# Patient Record
Sex: MALE | Race: ASIAN | NOT HISPANIC OR LATINO | Employment: FULL TIME | URBAN - METROPOLITAN AREA
[De-identification: names, ages, dates, MRNs, and addresses within clinical notes are randomized per-mention and may not be internally consistent; named-entity substitution may affect disease eponyms.]

---

## 2017-01-09 ENCOUNTER — OFFICE VISIT (OUTPATIENT)
Dept: UROLOGY | Facility: CLINIC | Age: 47
End: 2017-01-09
Payer: COMMERCIAL

## 2017-01-09 ENCOUNTER — APPOINTMENT (OUTPATIENT)
Dept: UROLOGY | Facility: CLINIC | Age: 47
End: 2017-01-09
Payer: COMMERCIAL

## 2017-01-09 VITALS
WEIGHT: 190 LBS | HEIGHT: 68 IN | DIASTOLIC BLOOD PRESSURE: 92 MMHG | HEART RATE: 80 BPM | SYSTOLIC BLOOD PRESSURE: 130 MMHG | BODY MASS INDEX: 28.79 KG/M2

## 2017-01-09 DIAGNOSIS — C67.2 MALIGNANT NEOPLASM OF LATERAL WALL OF URINARY BLADDER (H): Primary | ICD-10-CM

## 2017-01-09 DIAGNOSIS — Z85.51 PERSONAL HISTORY OF MALIGNANT NEOPLASM OF BLADDER: Primary | ICD-10-CM

## 2017-01-09 LAB
ALBUMIN UR-MCNC: NEGATIVE MG/DL
APPEARANCE UR: CLEAR
BILIRUB UR QL STRIP: NEGATIVE
COLOR UR AUTO: YELLOW
GLUCOSE UR STRIP-MCNC: NEGATIVE MG/DL
HGB UR QL STRIP: NEGATIVE
KETONES UR STRIP-MCNC: NEGATIVE MG/DL
LEUKOCYTE ESTERASE UR QL STRIP: NEGATIVE
NITRATE UR QL: NEGATIVE
PH UR STRIP: 6 PH (ref 5–7)
SP GR UR STRIP: >1.03 (ref 1–1.03)
URN SPEC COLLECT METH UR: NORMAL
UROBILINOGEN UR STRIP-ACNC: 0.2 EU/DL (ref 0.2–1)

## 2017-01-09 PROCEDURE — 99212 OFFICE O/P EST SF 10 MIN: CPT | Mod: 25 | Performed by: UROLOGY

## 2017-01-09 PROCEDURE — 88305 TISSUE EXAM BY PATHOLOGIST: CPT | Performed by: UROLOGY

## 2017-01-09 PROCEDURE — 81003 URINALYSIS AUTO W/O SCOPE: CPT | Performed by: UROLOGY

## 2017-01-09 PROCEDURE — 52224 CYSTOSCOPY AND TREATMENT: CPT | Performed by: UROLOGY

## 2017-01-09 RX ORDER — CIPROFLOXACIN 500 MG/1
500 TABLET, FILM COATED ORAL 2 TIMES DAILY
Qty: 1 TABLET | Refills: 0 | Status: SHIPPED | OUTPATIENT
Start: 2017-01-09 | End: 2017-01-16

## 2017-01-09 ASSESSMENT — PAIN SCALES - GENERAL: PAINLEVEL: NO PAIN (0)

## 2017-01-09 NOTE — NURSING NOTE
Chief Complaint   Patient presents with     Cystoscopy     bladder biopsy     Snow Meneses LPN 9:10 AM January 9, 2017    Prior to the start of the procedure and with procedural staff participation, I verbally confirmed the patient s identity using two indicators, relevant allergies, that the procedure was appropriate and matched the consent or emergent situation, and that the correct equipment/implants were available. Immediately prior to starting the procedure I conducted the Time Out with the procedural staff and re-confirmed the patient s name, procedure, and site/side. (The Joint Commission universal protocol was followed.)  Yes    Sedation (Moderate or Deep): None    Snow Meneses LPN 9:10 AM January 9, 2017

## 2017-01-09 NOTE — Clinical Note
1/9/2017       RE: Adarsh Lozano  [address] [facility] [address]     Dear Colleague,    Thank you for referring your patient, Adarsh Lozano, to the [facility] UROLOGY CLINIC [place] at [facility]. Please see a copy of my visit note below.    This very pleasant  46-year-old gentleman returns today for cystoscopy with bladder biopsy and fulguration after a recent cystoscopy had shown a small recurrent tumor on the left side of the bladder in a patient with a previous history of low-grade superficial bladder cancer.    Procedure.  Cystoscopywith bladder biopsy and fulguration of tumor.  Surgeon.  Daisy.  Anesthesia.  Local anesthesia  Procedure.  With the patient in the supine position and with the genital area prepped and draped in the customary fashion, with local anesthetic jelly in the urethra and local anesthetic solution in the bladder for 15 minutes, the flexible cystoscope was carefully passed into the urethra.  The penile urethra is normal.  The external sphincter is intact.  Prostatic urethra is nonobstructive. .  The interior the bladder was carefully inspected, a small tumor, 5 mm in diameter, was seen just below the site of a previous bladder tumor resection..  No other tumors were seen in the bladder.  This tumor was then biopsied with the very small biopsy instrument, the biopsy placed in formalin to be sent for histologic examination.  I then removed the biopsy instrument and passed a small Bugbee electrode and the entire area of the tumor was carefully fulgurated to my satisfaction.i then withdrew the instrument.  There were no other significant features and no complications.    Impression.  I discussed the procedure with the patient in detail, and then had a careful discussion with the patient about the need for careful follow-up, we will review the pathology report, we did discuss the possibility of intravesical treatment  depending on the pathology report and the need for careful long-term surveillance.    Plan.  I will see him in one week for review of the pathology report.    Time.  10 minutes was spent in addition to the procedure and noted discussed the findings, the potential additional alternative therapeutic options, and the need for long-term surveillance    Again, thank you for allowing me to participate in the care of your patient.      Sincerely,    Aiden Villa MD

## 2017-01-09 NOTE — PROGRESS NOTES
This very pleasant  46-year-old gentleman returns today for cystoscopy with bladder biopsy and fulguration after a recent cystoscopy had shown a small recurrent tumor on the left side of the bladder in a patient with a previous history of low-grade superficial bladder cancer.    Procedure.  Cystoscopywith bladder biopsy and fulguration of tumor.  Surgeon.  Daisy.  Anesthesia.  Local anesthesia  Procedure.  With the patient in the supine position and with the genital area prepped and draped in the customary fashion, with local anesthetic jelly in the urethra and local anesthetic solution in the bladder for 15 minutes, the flexible cystoscope was carefully passed into the urethra.  The penile urethra is normal.  The external sphincter is intact.  Prostatic urethra is nonobstructive. .  The interior the bladder was carefully inspected, a small tumor, 5 mm in diameter, was seen just below the site of a previous bladder tumor resection..  No other tumors were seen in the bladder.  This tumor was then biopsied with the very small biopsy instrument, the biopsy placed in formalin to be sent for histologic examination.  I then removed the biopsy instrument and passed a small Bugbee electrode and the entire area of the tumor was carefully fulgurated to my satisfaction.i then withdrew the instrument.  There were no other significant features and no complications.    Impression.  I discussed the procedure with the patient in detail, and then had a careful discussion with the patient about the need for careful follow-up, we will review the pathology report, we did discuss the possibility of intravesical treatment depending on the pathology report and the need for careful long-term surveillance.    Plan.  I will see him in one week for review of the pathology report.    Time.  10 minutes was spent in addition to the procedure and noted discussed the findings, the potential additional alternative therapeutic options, and the  need for long-term surveillance

## 2017-01-09 NOTE — PATIENT INSTRUCTIONS
"     AFTER YOUR CYSTOSCOPY         You have just completed a cystoscopy, or \"cysto\", which allowed your physician to learn more about your bladder (or to remove a stent placed after surgery). We suggest that you continue to avoid caffeine, fruit juice, and alcohol for the next 24 hours, however, you are encouraged to return to your normal activities.       A few things that are considered normal after your cystoscopy:    * small amount of bleeding (or spotting) that clears within the next 24 hours    * slight burning sensation with urination    * sensation to of needing to avoid more frequently    * the feeling of \"air\" in your urine    * mild discomfort that is relieved with Tylonol        Please contact our office promptly if you:    * develop a fever above 101 degrees    * are unable to urinate    * develop bright red blood that does not stop    * severe pain or swelling        And of course, please contact our office with any concerns or questions [phone]      "

## 2017-01-11 LAB — COPATH REPORT: NORMAL

## 2017-01-12 NOTE — PROGRESS NOTES
This very pleasant 46-year-old gentleman returns today cystoscopy, biopsy and fulguration of recurrent bladder tumor    Procedure.  Cystoscopy, bladder biopsy and fulguration of bladder tumor    Surgeon.Daisy  Anesthesia.  Local anesthesia  Description.  With the patient in the supine position.  With the genital area prepped and draped in lithotomy fashion and local anesthetic in the urethra.  The flexible cystoscope was then passed into the penile urethra.  The penile urethra and meatus are normal.  The external sphincter is intact.  Prostatic urethra was nonobstructed and non-hyperplastic.  The interior the bladder was carefully inspected.  There is no evidence of stone formation.  There is no evidence of trabeculation.  A small recurrent tumor is seen on the left side of the bladder adjacent to the site of a previous bladder tumor treatment.  It is approximate 6 mm in diameter.  With the small biopsy instrument I was able to take a good biopsy from the tumor.  This was sent off in formalin.  I then reinserted the cystoscope and using the small Bugbee electrode was able to carefully fulgurate and destroy the entire area with the coagulating current.  The patient tolerated the procedure well.  No Irby catheter was placed.    Plan.  The patient will return to the office in a week's time for discussion of the pathology report and to discuss potential future surveillance of management options.    Time.  10 minutes was spent in addition to the procedure and other discussed the findings to discuss the expectations following the procedure, the talked about plans for follow-up and surveillance and potential other treatment options

## 2017-01-16 ENCOUNTER — OFFICE VISIT (OUTPATIENT)
Dept: UROLOGY | Facility: CLINIC | Age: 47
End: 2017-01-16
Payer: COMMERCIAL

## 2017-01-16 VITALS
HEIGHT: 68 IN | SYSTOLIC BLOOD PRESSURE: 128 MMHG | DIASTOLIC BLOOD PRESSURE: 68 MMHG | BODY MASS INDEX: 28.79 KG/M2 | WEIGHT: 190 LBS

## 2017-01-16 DIAGNOSIS — C67.2 MALIGNANT NEOPLASM OF LATERAL WALL OF URINARY BLADDER (H): Primary | ICD-10-CM

## 2017-01-16 PROCEDURE — 99214 OFFICE O/P EST MOD 30 MIN: CPT | Performed by: UROLOGY

## 2017-01-16 ASSESSMENT — PAIN SCALES - GENERAL: PAINLEVEL: NO PAIN (0)

## 2017-01-16 NOTE — NURSING NOTE
Chief Complaint   Patient presents with     Results     Go over pathology results      Alison Zhang LPN

## 2017-01-16 NOTE — MR AVS SNAPSHOT
"              After Visit Summary   1/16/2017    Adarsh Lozano    MRN: [id number]           Patient Information     Date Of Birth          1970        Visit Information        Provider Department      1/16/2017 9:40 AM Aiden Villa MD [facility]         Follow-ups after your visit        Your next 10 appointments already scheduled     Jan 16, 2017  9:40 AM   Return Visit with Aiden Villa MD   [facility])    [address]  [address]  [facility] [address] [phone]              Future tests that were ordered for you today     Open Future Orders        Priority Expected Expires Ordered    CYSTOURETHROSCOPY W BIOPSY (48898) Routine  2/23/2017 1/9/2017            Who to contact     If you have questions or need follow up information about today's clinic visit or your schedule please contact [facility]OLOGY [facility] directly at [phone].  Normal or non-critical lab and imaging results will be communicated to you by En[employer] Systemshart, letter or phone within 4 business days after the clinic has received the results. If you do not hear from us within 7 days, please contact the clinic through [employer] or phone. If you have a critical or abnormal lab result, we will notify you by phone as soon as possible.  Submit refill requests through [employer] or call your pharmacy and they will forward the refill request to us. Please allow 3 business days for your refill to be completed.          Additional Information About Your Visit        Enmetric Systemshart Information     Choosly lets you send messages to your doctor, view your test results, renew your prescriptions, schedule appointments and more. To sign up, go to [email or web address] . Click on \"Log in\" on the left side of the screen, which will take you to the Welcome page. Then click on \"Sign up Now\" on the right side of the " page.     You will be asked to enter the access code listed below, as well as some personal information. Please follow the directions to create your username and password.     Your access code is: [email or web address]  Expires: 3/19/2017  2:57 PM     Your access code will  in 90 days. If you need help or a new code, please call your [place] clinic or [phone].        Care EveryWhere ID     This is your Care EveryWhere ID. This could be used by other organizations to access your [place] medical records  [id number]         Blood Pressure from Last 3 Encounters:   17 130/92   16 130/88   16 124/76    Weight from Last 3 Encounters:   17 86.183 kg (190 lb)   16 88.451 kg (195 lb)   16 83.008 kg (183 lb)              Today, you had the following     No orders found for display       Primary Care Provider Office Phone # Fax #    Dayron Michael Grewal [phone]       [address] [address]        Thank you!     Thank you for choosing [facility] UROLOGY [facility]  for your care. Our goal is always to provide you with excellent care. Hearing back from our patients is one way we can continue to improve our services. Please take a few minutes to complete the written survey that you may receive in the mail after your visit with us. Thank you!             Your Updated Medication List - Protect others around you: Learn how to safely use, store and throw away your medicines at www.disposemymeds.org.          This list is accurate as of: 17 10:06 AM.  Always use your most recent med list.                   Brand Name Dispense Instructions for use    albuterol 108 (90 BASE) MCG/ACT Inhaler    PROAIR HFA/PROVENTIL HFA/VENTOLIN HFA    2 Inhaler    Inhale 2 puffs into the lungs every 6 hours       ciprofloxacin 500 MG tablet    CIPRO    1 tablet    Take 1 tablet (500 mg) by mouth 2 times daily        lisinopril 10 MG tablet    PRINIVIL/ZESTRIL    90 tablet    Take 1 tablet (10 mg) by mouth daily

## 2017-01-16 NOTE — PROGRESS NOTES
This very pleasant46-year-old gentleman returns today for discussion of a pathology report.  We recall, he had first presented in 2014 after an episode of hematuria.  He was found to have a low-grade superficial bladder cancer that was resected  He did have a small recurrence about a year later.,  And then almost 2 years after that had a further small recurrence.  This was recently treated in the office .  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  This is a reassuringreport.  I therefore had a lengthy discussion with patient and his wife today to discuss these findings and also to talk about whether we should consider intravesical treatment.  I went over the potential treatments including the use of mitomycin or BCGand discussed the potential side effects that could occur and the benefits that could accrue.  His disease has been low-grade and therefore it is not essential at this point to consider such treatment, but it may reduce the potential for recurrence.  He and his wife will discuss this.  Whatever happens we will need to repeat cystoscopy in the office in 3 months.  I went over the entire situation with the patient and his wife in detail today.  I answered all the questions.    Plan.  Cystoscopy in 3 months along with urine cytology.  They will be considering whether to proceed with BCG treatments ×6.    Time.  25 minutes.  Greater than 50% in consultation and lengthy discussion about potential for intravesical treatment

## 2017-01-16 NOTE — Clinical Note
1/16/2017       RE: Adarsh Lozano  [address] [facility] [address]     Dear Colleague,    Thank you for referring your patient, Adarsh Lozano, to the [facility] UROLOGY CLINIC [place] at [facility]. Please see a copy of my visit note below.    This very pleasant46-year-old gentleman returns today for discussion of a pathology report.  We recall, he had first presented in 2014 after an episode of hematuria.  He was found to have a low-grade superficial bladder cancer that was resected  He did have a small recurrence about a year later.,  And then almost 2 years after that had a further small recurrence.  This was recently treated in the office .  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  This is a reassuringreport.  I therefore had a lengthy discussion with patient and his wife today to discuss these findings and also to talk about whether we should consider intravesical treatment.  I went over the potential treatments including the use of mitomycin or BCGand discussed the potential side effects that could occur and the benefits that could accrue.  His disease has been low-grade and therefore it is not essential at this point to consider such treatment, but it may reduce the potential for recurrence.  He and his wife will discuss this.  Whatever happens we will need to repeat cystoscopy in the office in 3 months.  I went over the entire situation with the patient and his wife in detail today.  I answered all the questions.    Plan.  Cystoscopy in 3 months along with urine cytology.  They will be considering whether to proceed with BCG treatments ×6.    Time.  25 minutes.  Greater than 50% in consultation and lengthy discussion about potential for intravesical treatment    Again, thank you for allowing me to participate in the care of your patient.       Sincerely,    Aiden Villa MD

## 2017-04-10 ENCOUNTER — OFFICE VISIT (OUTPATIENT)
Dept: UROLOGY | Facility: CLINIC | Age: 47
End: 2017-04-10
Payer: COMMERCIAL

## 2017-04-10 VITALS
BODY MASS INDEX: 28.79 KG/M2 | SYSTOLIC BLOOD PRESSURE: 124 MMHG | WEIGHT: 190 LBS | HEIGHT: 68 IN | HEART RATE: 76 BPM | DIASTOLIC BLOOD PRESSURE: 70 MMHG

## 2017-04-10 DIAGNOSIS — C67.2 MALIGNANT NEOPLASM OF LATERAL WALL OF URINARY BLADDER (H): Primary | ICD-10-CM

## 2017-04-10 DIAGNOSIS — Z85.51 PERSONAL HISTORY OF MALIGNANT NEOPLASM OF BLADDER: ICD-10-CM

## 2017-04-10 DIAGNOSIS — Z85.51 PERSONAL HISTORY OF MALIGNANT NEOPLASM OF BLADDER: Primary | ICD-10-CM

## 2017-04-10 LAB
ALBUMIN UR-MCNC: ABNORMAL MG/DL
APPEARANCE UR: CLEAR
BILIRUB UR QL STRIP: NEGATIVE
COLOR UR AUTO: YELLOW
GLUCOSE UR STRIP-MCNC: NEGATIVE MG/DL
HGB UR QL STRIP: NEGATIVE
KETONES UR STRIP-MCNC: NEGATIVE MG/DL
LEUKOCYTE ESTERASE UR QL STRIP: NEGATIVE
NITRATE UR QL: NEGATIVE
PH UR STRIP: 6.5 PH (ref 5–7)
SP GR UR STRIP: 1.02 (ref 1–1.03)
URN SPEC COLLECT METH UR: ABNORMAL
UROBILINOGEN UR STRIP-ACNC: 0.2 EU/DL (ref 0.2–1)

## 2017-04-10 PROCEDURE — 81003 URINALYSIS AUTO W/O SCOPE: CPT | Performed by: UROLOGY

## 2017-04-10 PROCEDURE — 99212 OFFICE O/P EST SF 10 MIN: CPT | Mod: 25 | Performed by: UROLOGY

## 2017-04-10 PROCEDURE — 52000 CYSTOURETHROSCOPY: CPT | Performed by: UROLOGY

## 2017-04-10 RX ORDER — CIPROFLOXACIN 500 MG/1
500 TABLET, FILM COATED ORAL 2 TIMES DAILY
Qty: 1 TABLET | Refills: 0 | Status: SHIPPED | OUTPATIENT
Start: 2017-04-10 | End: 2019-02-20

## 2017-04-10 ASSESSMENT — PAIN SCALES - GENERAL: PAINLEVEL: NO PAIN (0)

## 2017-04-10 NOTE — PATIENT INSTRUCTIONS
"     AFTER YOUR CYSTOSCOPY         You have just completed a cystoscopy, or \"cysto\", which allowed your physician to learn more about your bladder (or to remove a stent placed after surgery). We suggest that you continue to avoid caffeine, fruit juice, and alcohol for the next 24 hours, however, you are encouraged to return to your normal activities.       A few things that are considered normal after your cystoscopy:    * small amount of bleeding (or spotting) that clears within the next 24 hours    * slight burning sensation with urination    * sensation to of needing to avoid more frequently    * the feeling of \"air\" in your urine    * mild discomfort that is relieved with Tylonol        Please contact our office promptly if you:    * develop a fever above 101 degrees    * are unable to urinate    * develop bright red blood that does not stop    * severe pain or swelling        And of course, please contact our office with any concerns or questions [phone]      "

## 2017-04-10 NOTE — NURSING NOTE
Chief Complaint   Patient presents with     History of Bladder Cancer     Prior to the start of the procedure and with procedural staff participation, I verbally confirmed the patient s identity using two indicators, relevant allergies, that the procedure was appropriate and matched the consent or emergent situation, and that the correct equipment/implants were available. Immediately prior to starting the procedure I conducted the Time Out with the procedural staff and re-confirmed the patient s name, procedure, and site/side. (The Joint Commission universal protocol was followed.)  Yes    Sedation (Moderate or Deep): None  Alison Zhang LPN

## 2017-04-10 NOTE — LETTER
4/10/2017       RE: Adarsh Lozano  [address]  [facility] [place] [address]     Dear Colleague,    Thank you for referring your patient, Adarsh Lozano, to the [facility] UROLOGY CLINIC [place] at [facility]. Please see a copy of my visit note below.    This very pleasant 46-year-old gentleman returns today for check cystoscopy.  We recall that he has a history, of gross hematuria in 2014 and found to have the official tumor in the bladder which was resected and found to be consistent with a low-grade TCC of the bladder.  He did have one small recurrence about 6 months later in late 2014 and then another recurrence 2 years later just 3 months ago and when this was resected it was found to be a low-grade bladder tumor considered to have low malignant potential.  He returns for check cystoscopy today.    Procedure.  Cystoscopy.  Surgeon.   Daisy    Anesthesia.  Local anesthesia.  Discretion.  With the patient in the supine position and with gentle area prepped and draped in the customary fashion with Tara cystoscope was carefully passed into the urethra.  The interior of the urethra was normal.  The external sphincter was intact.  Prostate urethra showed minimal hyperplasia.  Interior of the bladder was carefully inspected.  There was one area of very mild inflammation but no obvious tumor could be seen.  When remarkable features.    Impression the urothelium appears stable at the present time but he has had a propensity for recurrence even though all the tumors had been very low-grade.  We will continue to monitor this very closely.  I did have a very careful discussion with him today about the nature of this type of tumor and we also talked about the potential need to use BCG or some other agent if I was concerned about future recurrence.  We did go over the use of BCG and talked about the benefits and side effects and potential complications associated  "with this.    Plan.  Cystoscopy and urine cytology with fish test in 3 months.    Time.  Tenderness is present in addition to the procedure to have an extensive discussion about alternative treatment options can need for close follow-up as outlined above.    \"This dictation was performed with voice recognition software and may contain errors,  omissions and inadvertent word substitution.\"      Again, thank you for allowing me to participate in the care of your patient.      Sincerely,    Aiden Villa MD      "

## 2017-04-10 NOTE — MR AVS SNAPSHOT
"              After Visit Summary   4/10/2017    Adarsh Lozano    MRN: [id number]           Patient Information     Date Of Birth          1970        Visit Information        Provider Department      4/10/2017 2:10 PM Aiden Villa MD; [facility]        Today's Diagnoses     Malignant neoplasm of lateral wall of urinary bladder (H)    -  1    Personal history of malignant neoplasm of bladder          Care Instructions         AFTER YOUR CYSTOSCOPY         You have just completed a cystoscopy, or \"cysto\", which allowed your physician to learn more about your bladder (or to remove a stent placed after surgery). We suggest that you continue to avoid caffeine, fruit juice, and alcohol for the next 24 hours, however, you are encouraged to return to your normal activities.       A few things that are considered normal after your cystoscopy:    * small amount of bleeding (or spotting) that clears within the next 24 hours    * slight burning sensation with urination    * sensation to of needing to avoid more frequently    * the feeling of \"air\" in your urine    * mild discomfort that is relieved with Tylonol        Please contact our office promptly if you:    * develop a fever above 101 degrees    * are unable to urinate    * develop bright red blood that does not stop    * severe pain or swelling        And of course, please contact our office with any concerns or questions [phone]            Follow-ups after your visit        Your next 10 appointments already scheduled     Jul 10, 2017  2:00 PM CDT   Cystoscopy with Aiden Villa MD, [facility] ([facility])    [address]  [address]  [facility] [id number] [phone]              Who to contact     If you have questions or need follow up information about today's clinic visit or your schedule please contact [facility]" "[facility] [place] directly at [phone].  Normal or non-critical lab and imaging results will be communicated to you by MyChart, letter or phone within 4 business days after the clinic has received the results. If you do not hear from us within 7 days, please contact the clinic through ReaLyn[employer] or phone. If you have a critical or abnormal lab result, we will notify you by phone as soon as possible.  Submit refill requests through Scan or call your pharmacy and they will forward the refill request to us. Please allow 3 business days for your refill to be completed.          Additional Information About Your Visit        [employer] Information     Scan lets you send messages to your doctor, view your test results, renew your prescriptions, schedule appointments and more. To sign up, go to [email or web address] . Click on \"Log in\" on the left side of the screen, which will take you to the Welcome page. Then click on \"Sign up Now\" on the right side of the page.     You will be asked to enter the access code listed below, as well as some personal information. Please follow the directions to create your username and password.     Your access code is: [email or web address]  Expires: 2017  2:40 PM     Your access code will  in 90 days. If you need help or a new code, please call your [place] clinic or [phone].        Care EveryWhere ID     This is your Care EveryWhere ID. This could be used by other organizations to access your [place] medical records  [id number]        Your Vitals Were     Pulse Height BMI (Body Mass Index)             76 1.727 m (5' 8\") 28.89 kg/m2          Blood Pressure from Last 3 Encounters:   04/10/17 124/70   17 128/68   17 (!) 130/92    Weight from Last 3 Encounters:   04/10/17 86.2 kg (190 lb)   17 86.2 kg (190 lb)   17 86.2 kg (190 lb)              We Performed the Following     UA without Microscopic        Primary Care Provider Office " Phone # Fax #    Dayron Grewal [phone]       [facility] [id number] [place] [address]        Thank you!     Thank you for choosing [facility] [place]  for your care. Our goal is always to provide you with excellent care. Hearing back from our patients is one way we can continue to improve our services. Please take a few minutes to complete the written survey that you may receive in the mail after your visit with us. Thank you!             Your Updated Medication List - Protect others around you: Learn how to safely use, store and throw away your medicines at www.disposemymeds.org.          This list is accurate as of: 4/10/17  2:40 PM.  Always use your most recent med list.                   Brand Name Dispense Instructions for use    albuterol 108 (90 BASE) MCG/ACT Inhaler    PROAIR HFA/PROVENTIL HFA/VENTOLIN HFA    2 Inhaler    Inhale 2 puffs into the lungs every 6 hours       lisinopril 10 MG tablet    PRINIVIL/ZESTRIL    90 tablet    Take 1 tablet (10 mg) by mouth daily

## 2017-04-10 NOTE — PROGRESS NOTES
"This very pleasant 46-year-old gentleman returns today for check cystoscopy.  We recall that he has a history, of gross hematuria in 2014 and found to have the official tumor in the bladder which was resected and found to be consistent with a low-grade TCC of the bladder.  He did have one small recurrence about 6 months later in late 2014 and then another recurrence 2 years later just 3 months ago and when this was resected it was found to be a low-grade bladder tumor considered to have low malignant potential.  He returns for check cystoscopy today.    Procedure.  Cystoscopy.  Surgeon.   Daisy    Anesthesia.  Local anesthesia.  Discretion.  With the patient in the supine position and with gentle area prepped and draped in the customary fashion with Tara cystoscope was carefully passed into the urethra.  The interior of the urethra was normal.  The external sphincter was intact.  Prostate urethra showed minimal hyperplasia.  Interior of the bladder was carefully inspected.  There was one area of very mild inflammation but no obvious tumor could be seen.  When remarkable features.    Impression the urothelium appears stable at the present time but he has had a propensity for recurrence even though all the tumors had been very low-grade.  We will continue to monitor this very closely.  I did have a very careful discussion with him today about the nature of this type of tumor and we also talked about the potential need to use BCG or some other agent if I was concerned about future recurrence.  We did go over the use of BCG and talked about the benefits and side effects and potential complications associated with this.    Plan.  Cystoscopy and urine cytology with fish test in 3 months.    Time.  Tenderness is present in addition to the procedure to have an extensive discussion about alternative treatment options can need for close follow-up as outlined above.    \"This dictation was performed with voice recognition " "software and may contain errors,  omissions and inadvertent word substitution.\"    "

## 2017-07-07 DIAGNOSIS — C67.9 BLADDER CANCER (H): Primary | ICD-10-CM

## 2017-07-10 ENCOUNTER — OFFICE VISIT (OUTPATIENT)
Dept: UROLOGY | Facility: CLINIC | Age: 47
End: 2017-07-10
Payer: COMMERCIAL

## 2017-07-10 VITALS
SYSTOLIC BLOOD PRESSURE: 130 MMHG | DIASTOLIC BLOOD PRESSURE: 100 MMHG | HEIGHT: 68 IN | HEART RATE: 100 BPM | BODY MASS INDEX: 28.79 KG/M2 | WEIGHT: 190 LBS

## 2017-07-10 DIAGNOSIS — C67.2 MALIGNANT NEOPLASM OF LATERAL WALL OF URINARY BLADDER (H): ICD-10-CM

## 2017-07-10 LAB
ALBUMIN UR-MCNC: NEGATIVE MG/DL
APPEARANCE UR: CLEAR
BILIRUB UR QL STRIP: NEGATIVE
COLOR UR AUTO: YELLOW
GLUCOSE UR STRIP-MCNC: NEGATIVE MG/DL
HGB UR QL STRIP: NEGATIVE
KETONES UR STRIP-MCNC: NEGATIVE MG/DL
LEUKOCYTE ESTERASE UR QL STRIP: NEGATIVE
NITRATE UR QL: NEGATIVE
PH UR STRIP: 6 PH (ref 5–7)
SP GR UR STRIP: 1.02 (ref 1–1.03)
URN SPEC COLLECT METH UR: NORMAL
UROBILINOGEN UR STRIP-ACNC: 0.2 EU/DL (ref 0.2–1)

## 2017-07-10 PROCEDURE — 99212 OFFICE O/P EST SF 10 MIN: CPT | Mod: 25 | Performed by: UROLOGY

## 2017-07-10 PROCEDURE — 81003 URINALYSIS AUTO W/O SCOPE: CPT | Performed by: UROLOGY

## 2017-07-10 PROCEDURE — 52000 CYSTOURETHROSCOPY: CPT | Performed by: UROLOGY

## 2017-07-10 PROCEDURE — 88112 CYTOPATH CELL ENHANCE TECH: CPT | Performed by: UROLOGY

## 2017-07-10 RX ORDER — CIPROFLOXACIN 500 MG/1
500 TABLET, FILM COATED ORAL 2 TIMES DAILY
Qty: 1 TABLET | Refills: 0 | Status: SHIPPED | OUTPATIENT
Start: 2017-07-10 | End: 2017-12-11

## 2017-07-10 ASSESSMENT — PAIN SCALES - GENERAL: PAINLEVEL: NO PAIN (0)

## 2017-07-10 NOTE — NURSING NOTE
Chief Complaint   Patient presents with     Cystoscopy     Bladder cancer recheck     Snow Meneses LPN 2:02 PM July 10, 2017    Prior to the start of the procedure and with procedural staff participation, I verbally confirmed the patient s identity using two indicators, relevant allergies, that the procedure was appropriate and matched the consent or emergent situation, and that the correct equipment/implants were available. Immediately prior to starting the procedure I conducted the Time Out with the procedural staff and re-confirmed the patient s name, procedure, and site/side. I have wiped the patient off with the povidone-Iodine solution, draped them,  used Lidocaine hydrochloride jelly, and instilled sterile water into the bladder. (The Joint Commission universal protocol was followed.)  Yes    Sedation (Moderate or Deep): None    Snow Meneses LPN 2:02 PM July 10, 2017

## 2017-07-10 NOTE — LETTER
7/10/2017       RE: Adarsh Lozano  [address]  [facility] [address]     Dear Colleague,    Thank you for referring your patient, Adarsh Lozano, to the [facility] [place] at [facility]. Please see a copy of my visit note below.    This very pleasant 46-year-old gentleman returns today for check cystoscopy.  We recall, that he had presented in 2014 with gross hematuria.  A bladder tumor was identified and resected and was found to be low-grade TCC of the bladder with out evidence of invasion.  He had a further small recurrence 6 months later which is also low-grade, and then another recurrence 2 years after that also small and low-grade and noninvasive.  His check cystoscopy 6 months ago was negative.  He's had no other symptoms, no gross hematuria and no other remarkable medical issues since then.    Procedure.  Cystoscopy.  Surgeon. Daisy.  Anesthesia.  Local anesthesia.  Description.  With the patient in the supine position, and with the genital area prepped and draped in the custom fashion, and the 2% lidocaine and the urethra, the flexible cystoscope was carefully inserted.  The penile urethra was normal.  The external sphincter is intact.  Prosthetic urethra 2.5 cm without evidence of obstruction.  There is no significant median lobe.  Interior bladder was then carefully inspected.  There is no evidence of recurrent tumor in the bladder.  There is no evidence of stone formation.  There are no other remarkable features.    Impression.  The urothelium is stable and there are no other remarkable features at this time.  I recommended therefore at this time and repeat cystoscopy in 6 months and if the bladder is clear at that time we will then go to procedures once a year.  We did have a careful discussion today however about the need for regular follow-up and regular surveillance because of the propensity of this type of tumor to  "recurring the bladder although past history showing a low-grade noninvasive tumor does predict a good prognosis.  I did explain entire situation carefully to the patient in detail today.  I answered all his questions.    Plan.  Cystoscopy and cytology in 6 months.    Time.  10 minutes was spent in addition to the procedure in order to have a careful discussion about the follow-up, biological nature of this type of bladder tumor and the likelihood and potential for recurrence.    \"This dictation was performed with voice recognition software and may contain errors,  omissions and inadvertent word substitution.\"    Again, thank you for allowing me to participate in the care of your patient.      Sincerely,    Aiden Villa MD      "

## 2017-07-10 NOTE — PATIENT INSTRUCTIONS
"     AFTER YOUR CYSTOSCOPY         You have just completed a cystoscopy, or \"cysto\", which allowed your physician to learn more about your bladder (or to remove a stent placed after surgery). We suggest that you continue to avoid caffeine, fruit juice, and alcohol for the next 24 hours, however, you are encouraged to return to your normal activities.       A few things that are considered normal after your cystoscopy:    * small amount of bleeding (or spotting) that clears within the next 24 hours    * slight burning sensation with urination    * sensation to of needing to avoid more frequently    * the feeling of \"air\" in your urine    * mild discomfort that is relieved with Tylonol        Please contact our office promptly if you:    * develop a fever above 101 degrees    * are unable to urinate    * develop bright red blood that does not stop    * severe pain or swelling        And of course, please contact our office with any concerns or questions [phone]      "

## 2017-07-10 NOTE — PROGRESS NOTES
This very pleasant 46-year-old gentleman returns today for check cystoscopy.  We recall, that he had presented in 2014 with gross hematuria.  A bladder tumor was identified and resected and was found to be low-grade TCC of the bladder with out evidence of invasion.  He had a further small recurrence 6 months later which is also low-grade, and then another recurrence 2 years after that also small and low-grade and noninvasive.  His check cystoscopy 6 months ago was negative.  He's had no other symptoms, no gross hematuria and no other remarkable medical issues since then.    Procedure.  Cystoscopy.  Surgeon. Daisy.  Anesthesia.  Local anesthesia.  Description.  With the patient in the supine position, and with the genital area prepped and draped in the custom fashion, and the 2% lidocaine and the urethra, the flexible cystoscope was carefully inserted.  The penile urethra was normal.  The external sphincter is intact.  Prosthetic urethra 2.5 cm without evidence of obstruction.  There is no significant median lobe.  Interior bladder was then carefully inspected.  There is no evidence of recurrent tumor in the bladder.  There is no evidence of stone formation.  There are no other remarkable features.    Impression.  The urothelium is stable and there are no other remarkable features at this time.  I recommended therefore at this time and repeat cystoscopy in 6 months and if the bladder is clear at that time we will then go to procedures once a year.  We did have a careful discussion today however about the need for regular follow-up and regular surveillance because of the propensity of this type of tumor to recurring the bladder although past history showing a low-grade noninvasive tumor does predict a good prognosis.  I did explain entire situation carefully to the patient in detail today.  I answered all his questions.    Plan.  Cystoscopy and cytology in 6 months.    Time.  10 minutes was spent in addition to the  "procedure in order to have a careful discussion about the follow-up, biological nature of this type of bladder tumor and the likelihood and potential for recurrence.    \"This dictation was performed with voice recognition software and may contain errors,  omissions and inadvertent word substitution.\"  .  "

## 2017-07-10 NOTE — MR AVS SNAPSHOT
"              After Visit Summary   7/10/2017    Adarsh Lozano    MRN: [id number]           Patient Information     Date Of Birth          1970        Visit Information        Provider Department      7/10/2017 2:00 PM Aiden Villa MD; [facility]        Today's Diagnoses     Bladder cancer (H)          Care Instructions         AFTER YOUR CYSTOSCOPY         You have just completed a cystoscopy, or \"cysto\", which allowed your physician to learn more about your bladder (or to remove a stent placed after surgery). We suggest that you continue to avoid caffeine, fruit juice, and alcohol for the next 24 hours, however, you are encouraged to return to your normal activities.       A few things that are considered normal after your cystoscopy:    * small amount of bleeding (or spotting) that clears within the next 24 hours    * slight burning sensation with urination    * sensation to of needing to avoid more frequently    * the feeling of \"air\" in your urine    * mild discomfort that is relieved with Tylonol        Please contact our office promptly if you:    * develop a fever above 101 degrees    * are unable to urinate    * develop bright red blood that does not stop    * severe pain or swelling        And of course, please contact our office with any concerns or questions [phone]              Follow-ups after your visit        Follow-up notes from your care team     Return in about 6 months (around 1/10/2018) for cystoscopy, cytology.      Your next 10 appointments already scheduled     Jan 08, 2018  9:00 AM CST   Cystoscopy with Aiden Villa MD, [facility] ([facility])    [address]  [address]  [facility] [id number] [phone]              Who to contact     If you have questions or need follow up information about today's clinic visit or your schedule please contact " "[facility] directly at [phone].  Normal or non-critical lab and imaging results will be communicated to you by MyChart, letter or phone within 4 business days after the clinic has received the results. If you do not hear from us within 7 days, please contact the clinic through [employer] or phone. If you have a critical or abnormal lab result, we will notify you by phone as soon as possible.  Submit refill requests through Hand[employer] or call your pharmacy and they will forward the refill request to us. Please allow 3 business days for your refill to be completed.          Additional Information About Your Visit        [employer] Information     Hand[employer]oints lets you send messages to your doctor, view your test results, renew your prescriptions, schedule appointments and more. To sign up, go to [email or web address] . Click on \"Log in\" on the left side of the screen, which will take you to the Welcome page. Then click on \"Sign up Now\" on the right side of the page.     You will be asked to enter the access code listed below, as well as some personal information. Please follow the directions to create your username and password.     Your access code is: [email or web address]  Expires: 10/8/2017  2:27 PM     Your access code will  in 90 days. If you need help or a new code, please call your [place] clinic or [phone].        Care EveryWhere ID     This is your Care EveryWhere ID. This could be used by other organizations to access your [place] medical records  [id number]        Your Vitals Were     Pulse Height BMI (Body Mass Index)             100 1.727 m (5' 8\") 28.89 kg/m2          Blood Pressure from Last 3 Encounters:   07/10/17 (!) 130/100   04/10/17 124/70   17 128/68    Weight from Last 3 Encounters:   07/10/17 86.2 kg (190 lb)   04/10/17 86.2 kg (190 lb)   17 86.2 kg (190 lb)              We Performed the Following     Cytology non gyn     UA without " [facility]        Primary Care Provider Office Phone # Fax #    Dayron Grewal [phone]       [address] [address]        Equal Access to Services     MARQUITA MAN : Simon solano evelioo Sonikitaali, waaxda luqadaha, qaybta kaalmada adeegyada, karen sawyern gatito londono ladebbi alvarado. So [facility] [phone].    ATENCIÓN: Si habla español, tiene a reid disposición servicios gratuitos de asistencia lingüística. Llame al [phone].    We comply with applicable federal civil rights laws and [place] laws. We do not discriminate on the basis of race, color, national origin, age, disability sex, sexual orientation or gender identity.            Thank you!     Thank you for choosing [facility] [place]  for your care. Our goal is always to provide you with excellent care. Hearing back from our patients is one way we can continue to improve our services. Please take a few minutes to complete the written survey that you may receive in the mail after your visit with us. Thank you!             Your Updated Medication List - Protect others around you: Learn how to safely use, store and throw away your medicines at www.disposemymeds.org.          This list is accurate as of: 7/10/17  2:27 PM.  Always use your most recent med list.                   Brand Name Dispense Instructions for use Diagnosis    albuterol 108 (90 BASE) MCG/ACT Inhaler    PROAIR HFA/PROVENTIL HFA/VENTOLIN HFA    2 Inhaler    Inhale 2 puffs into the lungs every 6 hours    Benign hypertension       ciprofloxacin 500 MG tablet    CIPRO    1 tablet    Take 1 tablet (500 mg) by mouth 2 times daily    Personal history of malignant neoplasm of bladder       lisinopril 10 MG tablet    PRINIVIL/ZESTRIL    90 tablet    Take 1 tablet (10 mg) by mouth daily    Benign hypertension

## 2017-07-11 LAB — COPATH REPORT: NORMAL

## 2017-12-11 ENCOUNTER — OFFICE VISIT (OUTPATIENT)
Dept: FAMILY MEDICINE | Facility: CLINIC | Age: 47
End: 2017-12-11
Payer: COMMERCIAL

## 2017-12-11 VITALS
RESPIRATION RATE: 16 BRPM | HEART RATE: 74 BPM | SYSTOLIC BLOOD PRESSURE: 126 MMHG | TEMPERATURE: 97.9 F | DIASTOLIC BLOOD PRESSURE: 80 MMHG | WEIGHT: 194.6 LBS | HEIGHT: 68 IN | BODY MASS INDEX: 29.49 KG/M2 | OXYGEN SATURATION: 98 %

## 2017-12-11 DIAGNOSIS — E78.5 HYPERLIPIDEMIA LDL GOAL <130: Primary | ICD-10-CM

## 2017-12-11 DIAGNOSIS — I10 BENIGN HYPERTENSION: ICD-10-CM

## 2017-12-11 DIAGNOSIS — J45.20 MILD INTERMITTENT ASTHMA WITHOUT COMPLICATION: ICD-10-CM

## 2017-12-11 DIAGNOSIS — Z00.00 ROUTINE GENERAL MEDICAL EXAMINATION AT A HEALTH CARE FACILITY: ICD-10-CM

## 2017-12-11 DIAGNOSIS — C67.2 MALIGNANT NEOPLASM OF LATERAL WALL OF URINARY BLADDER (H): ICD-10-CM

## 2017-12-11 LAB
ANION GAP SERPL CALCULATED.3IONS-SCNC: 9 MMOL/L (ref 3–14)
BUN SERPL-MCNC: 19 MG/DL (ref 7–30)
CALCIUM SERPL-MCNC: 9.1 MG/DL (ref 8.5–10.1)
CHLORIDE SERPL-SCNC: 105 MMOL/L (ref 94–109)
CO2 SERPL-SCNC: 25 MMOL/L (ref 20–32)
CREAT SERPL-MCNC: 0.88 MG/DL (ref 0.66–1.25)
GFR SERPL CREATININE-BSD FRML MDRD: >90 ML/MIN/1.7M2
GLUCOSE SERPL-MCNC: 85 MG/DL (ref 70–99)
LDLC SERPL DIRECT ASSAY-MCNC: 132 MG/DL
POTASSIUM SERPL-SCNC: 3.8 MMOL/L (ref 3.4–5.3)
SODIUM SERPL-SCNC: 139 MMOL/L (ref 133–144)

## 2017-12-11 PROCEDURE — 83721 ASSAY OF BLOOD LIPOPROTEIN: CPT | Performed by: FAMILY MEDICINE

## 2017-12-11 PROCEDURE — 80048 BASIC METABOLIC PNL TOTAL CA: CPT | Performed by: FAMILY MEDICINE

## 2017-12-11 PROCEDURE — 99396 PREV VISIT EST AGE 40-64: CPT | Performed by: FAMILY MEDICINE

## 2017-12-11 PROCEDURE — 36415 COLL VENOUS BLD VENIPUNCTURE: CPT | Performed by: FAMILY MEDICINE

## 2017-12-11 RX ORDER — ALBUTEROL SULFATE 90 UG/1
2 AEROSOL, METERED RESPIRATORY (INHALATION) EVERY 6 HOURS
Qty: 1 INHALER | Refills: 3 | Status: SHIPPED | OUTPATIENT
Start: 2017-12-11 | End: 2018-10-11

## 2017-12-11 RX ORDER — LISINOPRIL 10 MG/1
10 TABLET ORAL DAILY
Qty: 90 TABLET | Refills: 3 | Status: SHIPPED | OUTPATIENT
Start: 2017-12-11 | End: 2019-07-22

## 2017-12-11 NOTE — NURSING NOTE
"Chief Complaint   Patient presents with     Physical     check on moles      Recheck Medication       Initial /80 (BP Location: Right arm, Patient Position: Chair, Cuff Size: Adult Large)  Pulse 74  Temp 97.9  F (36.6  C) (Oral)  Resp 16  Ht 5' 8\" (1.727 m)  Wt 194 lb 9.6 oz (88.3 kg)  SpO2 98%  BMI 29.59 kg/m2 Estimated body mass index is 29.59 kg/(m^2) as calculated from the following:    Height as of this encounter: 5' 8\" (1.727 m).    Weight as of this encounter: 194 lb 9.6 oz (88.3 kg).  Medication Reconciliation: complete   "

## 2017-12-11 NOTE — PROGRESS NOTES
SUBJECTIVE:   CC: Adarsh Lozano is an 47 year old male who presents for preventative health visit.     Physical   Annual:     Getting at least 3 servings of Calcium per day::  Yes    Bi-annual eye exam::  NO    Dental care twice a year::  Yes    Sleep apnea or symptoms of sleep apnea::  None    Diet::  Regular (no restrictions)    Frequency of exercise::  1 day/week    Duration of exercise::  15-30 minutes    Taking medications regularly::  Yes    Medication side effects::  None    Additional concerns today::  No              Today's PHQ-2 Score:   PHQ-2 ( 1999 [employer]) 12/11/2017   Q1: Little interest or pleasure in doing things 1   Q2: Feeling down, depressed or hopeless 1   PHQ-2 Score 2   Q1: Little interest or pleasure in doing things Several days   Q2: Feeling down, depressed or hopeless Several days   PHQ-2 Score 2       Abuse: Current or Past(Physical, Sexual or Emotional)- No  Do you feel safe in your environment - Yes    Social History   Substance Use Topics     Smoking status: Never Smoker     Smokeless tobacco: Never Used     Alcohol use Yes      Comment: occasionally     The patient does not drink >3 drinks per day nor >7 drinks per week.    Last PSA: No results found for: PSA    Reviewed orders with patient. Reviewed health maintenance and updated orders accordingly - Yes  BP Readings from Last 3 Encounters:   12/11/17 126/80   07/10/17 (!) 130/100   04/10/17 124/70    Wt Readings from Last 3 Encounters:   12/11/17 194 lb 9.6 oz (88.3 kg)   07/10/17 190 lb (86.2 kg)   04/10/17 190 lb (86.2 kg)                      Reviewed and updated as needed this visit by clinical staff         Reviewed and updated as needed this visit by Provider          Review of Systems  C: NEGATIVE for fever, chills, change in weight  I: NEGATIVE for worrisome rashes, moles or lesions  E: NEGATIVE for vision changes or irritation  ENT: NEGATIVE for ear, mouth and throat problems  R: NEGATIVE for significant cough or SOB  CV:  "NEGATIVE for chest pain, palpitations or peripheral edema  GI: NEGATIVE for nausea, abdominal pain, heartburn, or change in bowel habits   male: negative for dysuria, hematuria, decreased urinary stream, erectile dysfunction, urethral discharge  M: NEGATIVE for significant arthralgias or myalgia  N: NEGATIVE for weakness, dizziness or paresthesias  P: NEGATIVE for changes in mood or affect    OBJECTIVE:       Physical Exam   /80 (BP Location: Right arm, Patient Position: Chair, Cuff Size: Adult Large)  Pulse 74  Temp 97.9  F (36.6  C) (Oral)  Resp 16  Ht 5' 8\" (1.727 m)  Wt 194 lb 9.6 oz (88.3 kg)  SpO2 98%  BMI 29.59 kg/m2    GENERAL: healthy, alert and no distress  EYES: Eyes grossly normal to inspection, PERRL and conjunctivae and sclerae normal  HENT: ear canals and TM's normal, nose and mouth without ulcers or lesions  NECK: no adenopathy, no asymmetry, masses, or scars and thyroid normal to palpation  RESP: lungs clear to auscultation - no rales, rhonchi or wheezes  CV: regular rate and rhythm, normal S1 S2, no S3 or S4, no murmur, click or rub, no peripheral edema and peripheral pulses strong  ABDOMEN: soft, nontender, no hepatosplenomegaly, no masses and bowel sounds normal   (male): normal male genitalia without lesions or urethral discharge, no hernia  MS: no gross musculoskeletal defects noted, no edema  SKIN: no suspicious lesions or rashes  NEURO: Normal strength and tone, mentation intact and speech normal  PSYCH: mentation appears normal, affect normal/bright    ASSESSMENT/PLAN:   (E78.5) Hyperlipidemia LDL goal <130  (primary encounter diagnosis)  Comment: work on weight loss   Plan: Lipid panel reflex to direct LDL Fasting,         Comprehensive metabolic panel                COUNSELING:   Reviewed preventive health counseling, as reflected in patient instructions       Regular exercise       Healthy diet/nutrition       Vision screening       reports that he has never smoked. He " "has never used smokeless tobacco.      Estimated body mass index is 28.89 kg/(m^2) as calculated from the following:    Height as of 7/10/17: 5' 8\" (1.727 m).    Weight as of 7/10/17: 190 lb (86.2 kg).       (E78.5) Hyperlipidemia LDL goal <130  (primary encounter diagnosis)  Comment:   Plan: CANCELED: Lipid panel reflex to direct LDL         Fasting, CANCELED: Comprehensive metabolic         panel        LDL elevated lose weight     (Z00.00) Routine general medical examination at a health care facility  Comment:   Plan: Basic metabolic panel  (Ca, Cl, CO2, Creat,         Gluc, K, Na, BUN), LDL cholesterol direct            (J45.20) Mild intermittent asthma without complication  Comment:   Plan: albuterol (PROAIR HFA/PROVENTIL HFA/VENTOLIN         HFA) 108 (90 BASE) MCG/ACT Inhaler            (C67.2) Malignant neoplasm of lateral wall of urinary bladder (H)  Comment:   Plan: in reimission    (I10) Benign hypertension  Comment:   Plan: lisinopril (PRINIVIL/ZESTRIL) 10 MG tablet        At goal, note lipids     Counseling Resources:  ATP IV Guidelines  Pooled Cohorts Equation Calculator  FRAX Risk Assessment  ICSI Preventive Guidelines  Dietary Guidelines for Americans, 2010  USDA's MyPlate  ASA Prophylaxis  Lung CA Screening    Dayron Grewal MD  [facility] for HPI/ROS submitted by the patient on 12/11/2017   PHQ-2 Score: 2    "

## 2017-12-11 NOTE — LETTER
December 12, 2017      Adarsh Lozano  [address] [address] [address]        Dear ,    We are writing to inform you of your test results.  Cholesterol is too high but not dramatically. Work on a little weight loss to tune it up.    Resulted Orders   Basic metabolic panel  (Ca, Cl, CO2, Creat, Gluc, K, Na, BUN)   Result Value Ref Range    Sodium 139 133 - 144 mmol/L    Potassium 3.8 3.4 - 5.3 mmol/L    Chloride 105 94 - 109 mmol/L    Carbon Dioxide 25 20 - 32 mmol/L    Anion Gap 9 3 - 14 mmol/L    Glucose 85 70 - 99 mg/dL      Comment:      Non Fasting    Urea Nitrogen 19 7 - 30 mg/dL    Creatinine 0.88 0.66 - 1.25 mg/dL    GFR Estimate >90 >60 mL/min/1.7m2      Comment:      Non  GFR Calc    GFR Estimate If Black >90 >60 mL/min/1.7m2      Comment:       GFR Calc    Calcium 9.1 8.5 - 10.1 mg/dL   LDL cholesterol direct   Result Value Ref Range    LDL Cholesterol Direct 132 (H) <100 mg/dL      Comment:      Above desirable:  100-129 mg/dl  Borderline High:  130-159 mg/dL  High:             160-189 mg/dL  Very high:       >189 mg/dl         If you have any questions or concerns, please call the clinic at the number listed above.       Sincerely,        Dayron Grewal MD

## 2017-12-11 NOTE — MR AVS SNAPSHOT
"              After Visit Summary   12/11/2017    Adarsh Lozano    MRN: [id number]           Patient Information     Date Of Birth          1970        Visit Information        Provider Department      12/11/2017 1:30 PM Dayron Grewal MD [facility]        Today's Diagnoses     Hyperlipidemia LDL goal <130    -  1    Routine general medical examination at a health care facility        Mild intermittent asthma without complication        Malignant neoplasm of lateral wall of urinary bladder (H)        Benign hypertension           Follow-ups after your visit        Your next 10 appointments already scheduled     Jan 08, 2018  9:00 AM CST   Cystoscopy with Aiden Villa MD,  CYF   [facility] [place] ([facility])    [address]  [address]  [place] [address] [phone]              Who to contact     If you have questions or need follow up information about today's clinic visit or your schedule please contact [facility] directly at [phone].  Normal or non-critical lab and imaging results will be communicated to you by [employer], letter or phone within 4 business days after the clinic has received the results. If you do not hear from us within 7 days, please contact the clinic through [employer] or phone. If you have a critical or abnormal lab result, we will notify you by phone as soon as possible.  Submit refill requests through [employer] or call your pharmacy and they will forward the refill request to us. Please allow 3 business days for your refill to be completed.          Additional Information About Your Visit        [employer]ona[employer] Information     [employer] lets you send messages to your doctor, view your test results, renew your prescriptions, schedule appointments and more. To sign up, go to [email or web address] . Click on \"Log in\" on the left side of the screen, which will take you to the Welcome " "page. Then click on \"Sign up Now\" on the right side of the page.     You will be asked to enter the access code listed below, as well as some personal information. Please follow the directions to create your username and password.     Your access code is: [email or web address]  Expires: 3/11/2018  2:06 PM     Your access code will  in 90 days. If you need help or a new code, please call your [place] clinic or [phone].        Care EveryWhere ID     This is your Care EveryWhere ID. This could be used by other organizations to access your [place] medical records  [id number]        Your Vitals Were     Pulse Temperature Respirations Height Pulse Oximetry BMI (Body Mass Index)    74 97.9  F (36.6  C) (Oral) 16 5' 8\" (1.727 m) 98% 29.59 kg/m2       Blood Pressure from Last 3 Encounters:   17 126/80   07/10/17 (!) 130/100   04/10/17 124/70    Weight from Last 3 Encounters:   17 194 lb 9.6 oz (88.3 kg)   07/10/17 190 lb (86.2 kg)   04/10/17 190 lb (86.2 kg)              We Performed the Following     Basic metabolic panel  (Ca, Cl, CO2, Creat, Gluc, K, Na, BUN)     LDL cholesterol direct          Today's Medication Changes          These changes are accurate as of: 17  2:06 PM.  If you have any questions, ask your nurse or doctor.               These medicines have changed or have updated prescriptions.        Dose/Directions    ciprofloxacin 500 MG tablet   Commonly known as:  CIPRO   This may have changed:  Another medication with the same name was removed. Continue taking this medication, and follow the directions you see here.   Used for:  Personal history of malignant neoplasm of bladder   Changed by:  Aiden Villa MD        Dose:  500 mg   Take 1 tablet (500 mg) by mouth 2 times daily   Quantity:  1 tablet   Refills:  0            Where to get your medicines      Some of these will need a paper prescription and others can be bought over the counter.  Ask your nurse if you have " questions.     Bring a paper prescription for each of these medications     albuterol 108 (90 BASE) MCG/ACT Inhaler    lisinopril 10 MG tablet                Primary Care Provider Office Phone # Fax #    Dayron Michael Gerwal [phone] [address] [facility] [address]        Equal Access to Services     MARQUITA MAN : Hadii aad ku hadasho Soomaali, waaxda luqadaha, qaybta kaalmada adeegyada, waxay idiin hayaan adeeg kharash la'aan . So [facility] [phone].    ATENCIÓN: Si habla español, tiene a reid disposición servicios gratuitos de asistencia lingüística. Llame al [phone].    We comply with applicable federal civil rights laws and [place] laws. We do not discriminate on the basis of race, color, national origin, age, disability, sex, sexual orientation, or gender identity.            Thank you!     Thank you for choosing [facility]  for your care. Our goal is always to provide you with excellent care. Hearing back from our patients is one way we can continue to improve our services. Please take a few minutes to complete the written survey that you may receive in the mail after your visit with us. Thank you!             Your Updated Medication List - Protect others around you: Learn how to safely use, store and throw away your medicines at www.disposemymeds.org.          This list is accurate as of: 12/11/17  2:06 PM.  Always use your most recent med list.                   Brand Name Dispense Instructions for use Diagnosis    albuterol 108 (90 BASE) MCG/ACT Inhaler    PROAIR HFA/PROVENTIL HFA/VENTOLIN HFA    1 Inhaler    Inhale 2 puffs into the lungs every 6 hours    Mild intermittent asthma without complication       ciprofloxacin 500 MG tablet    CIPRO    1 tablet    Take 1 tablet (500 mg) by mouth 2 times daily    Personal history of malignant neoplasm of bladder       lisinopril 10 MG tablet    PRINIVIL/ZESTRIL    90 tablet    Take 1 tablet (10 mg) by  mouth daily    Benign hypertension

## 2017-12-12 ASSESSMENT — ASTHMA QUESTIONNAIRES: ACT_TOTALSCORE: 23

## 2018-01-08 ENCOUNTER — OFFICE VISIT (OUTPATIENT)
Dept: UROLOGY | Facility: CLINIC | Age: 48
End: 2018-01-08
Payer: COMMERCIAL

## 2018-01-08 VITALS
HEART RATE: 80 BPM | WEIGHT: 190 LBS | BODY MASS INDEX: 28.79 KG/M2 | HEIGHT: 68 IN | SYSTOLIC BLOOD PRESSURE: 128 MMHG | DIASTOLIC BLOOD PRESSURE: 100 MMHG

## 2018-01-08 DIAGNOSIS — C67.2 MALIGNANT NEOPLASM OF LATERAL WALL OF URINARY BLADDER (H): Primary | ICD-10-CM

## 2018-01-08 DIAGNOSIS — Z79.2 PROPHYLACTIC ANTIBIOTIC: Primary | ICD-10-CM

## 2018-01-08 DIAGNOSIS — C67.2 MALIGNANT NEOPLASM OF LATERAL WALL OF URINARY BLADDER (H): ICD-10-CM

## 2018-01-08 LAB
ALBUMIN UR-MCNC: NEGATIVE MG/DL
APPEARANCE UR: CLEAR
BILIRUB UR QL STRIP: NEGATIVE
COLOR UR AUTO: YELLOW
GLUCOSE UR STRIP-MCNC: NEGATIVE MG/DL
HGB UR QL STRIP: NEGATIVE
KETONES UR STRIP-MCNC: NEGATIVE MG/DL
LEUKOCYTE ESTERASE UR QL STRIP: NEGATIVE
NITRATE UR QL: NEGATIVE
PH UR STRIP: 5.5 PH (ref 5–7)
SOURCE: NORMAL
SP GR UR STRIP: 1.02 (ref 1–1.03)
UROBILINOGEN UR STRIP-ACNC: 0.2 EU/DL (ref 0.2–1)

## 2018-01-08 PROCEDURE — 99212 OFFICE O/P EST SF 10 MIN: CPT | Mod: 25 | Performed by: UROLOGY

## 2018-01-08 PROCEDURE — 81003 URINALYSIS AUTO W/O SCOPE: CPT | Performed by: UROLOGY

## 2018-01-08 PROCEDURE — 52000 CYSTOURETHROSCOPY: CPT | Performed by: UROLOGY

## 2018-01-08 RX ORDER — CIPROFLOXACIN 500 MG/1
500 TABLET, FILM COATED ORAL ONCE
Qty: 1 TABLET | Refills: 0 | Status: SHIPPED | OUTPATIENT
Start: 2018-01-08 | End: 2018-01-08

## 2018-01-08 RX ORDER — CIPROFLOXACIN 500 MG/1
500 TABLET, FILM COATED ORAL 2 TIMES DAILY
Qty: 1 TABLET | Refills: 0 | Status: SHIPPED | OUTPATIENT
Start: 2018-01-08 | End: 2018-11-19

## 2018-01-08 ASSESSMENT — PAIN SCALES - GENERAL: PAINLEVEL: NO PAIN (0)

## 2018-01-08 NOTE — MR AVS SNAPSHOT
"              After Visit Summary   1/8/2018    Adarsh Lozano    MRN: [id number]           Patient Information     Date Of Birth          1970        Visit Information        Provider Department      1/8/2018 9:00 AM Aiden Villa MD; [facility] [place]        Today's Diagnoses     Prophylactic antibiotic    -  1    Malignant neoplasm of lateral wall of urinary bladder (H)          Care Instructions         AFTER YOUR CYSTOSCOPY         You have just completed a cystoscopy, or \"cysto\", which allowed your physician to learn more about your bladder (or to remove a stent placed after surgery). We suggest that you continue to avoid caffeine, fruit juice, and alcohol for the next 24 hours, however, you are encouraged to return to your normal activities.       A few things that are considered normal after your cystoscopy:    * small amount of bleeding (or spotting) that clears within the next 24 hours    * slight burning sensation with urination    * sensation to of needing to avoid more frequently    * the feeling of \"air\" in your urine    * mild discomfort that is relieved with Tylonol        Please contact our office promptly if you:    * develop a fever above 101 degrees    * are unable to urinate    * develop bright red blood that does not stop    * severe pain or swelling        And of course, please contact our office with any concerns or questions [phone]      AFTER YOUR CYSTOSCOPY        You have just completed a cystoscopy, or \"cysto\", which allowed your physician to learn more about your bladder (or to remove a stent placed after surgery). We suggest that you continue to avoid caffeine, fruit juice, and alcohol for the next 24 hours, however, you are encouraged to return to your normal activities.         A few things that are considered normal after your cystoscopy:     * Small amount of bleeding (or spotting) that clears within the next 24 " "hours     * Slight burning sensation with urination     * Sensation to of needing to avoid more frequently     * The feeling of \"air\" in your urine     * Mild discomfort that is relieved with Tylenol        Please contact our office promptly if you:     * Develop a fever above 101 degrees     * Are unable to urinate     * Develop bright red blood that does not stop     * Severe pain or swelling         Please contact our office with any concerns or questions @[facility].          Follow-ups after your visit        Follow-up notes from your care team     Return in 12 months (on 1/8/2019).      Your next 10 appointments already scheduled     Feb 08, 2018 10:00 AM CST   (Arrive by 9:30 AM)   Cystoscopy with Aiden Villa MD, UA CYF, UA BX ROOM   [facility] [place] ([facility] [place])    [address]  [address]  [place] [facility] [id number] [phone]            Feb 15, 2018  2:30 PM CST   Return Visit with Aiden Villa MD   [facility] [place] ([facility] [place])    [address]  [address]  [facility] [id number] [phone]              Who to contact     If you have questions or need follow up information about today's clinic visit or your schedule please contact [facility] directly at [phone].  Normal or non-critical lab and imaging results will be communicated to you by MyChart, letter or phone within 4 business days after the clinic has received the results. If you do not hear from us within 7 days, please contact the clinic through MyChart or phone. If you have a critical or abnormal lab result, we will notify you by phone as soon as possible.  Submit refill requests through [employer] or call your pharmacy and they will forward the refill request to us. Please allow 3 business days for your refill to be completed.          Additional Information About Your Visit      " "  MyChart Information     [employer] lets you send messages to your doctor, view your test results, renew your prescriptions, schedule appointments and more. To sign up, go to [email or web address] . Click on \"Log in\" on the left side of the screen, which will take you to the Welcome page. Then click on \"Sign up Now\" on the right side of the page.     You will be asked to enter the access code listed below, as well as some personal information. Please follow the directions to create your username and password.     Your access code is: [email or web address]  Expires: 3/11/2018  2:06 PM     Your access code will  in 90 days. If you need help or a new code, please call your [place] clinic or [phone].        Care EveryWhere ID     This is your Care EveryWhere ID. This could be used by other organizations to access your [place] medical records  [id number]        Your Vitals Were     Pulse Height BMI (Body Mass Index)             80 1.727 m (5' 8\") 28.89 kg/m2          Blood Pressure from Last 3 Encounters:   18 (!) 128/100   17 126/80   07/10/17 (!) 130/100    Weight from Last 3 Encounters:   18 86.2 kg (190 lb)   17 88.3 kg (194 lb 9.6 oz)   07/10/17 86.2 kg (190 lb)              We Performed the Following     CYSTOURETHROSCOPY     UA without Microscopic          Today's Medication Changes          These changes are accurate as of: 18 10:02 AM.  If you have any questions, ask your nurse or doctor.               These medicines have changed or have updated prescriptions.        Dose/Directions    * ciprofloxacin 500 MG tablet   Commonly known as:  CIPRO   This may have changed:  Another medication with the same name was added. Make sure you understand how and when to take each.   Used for:  Personal history of malignant neoplasm of bladder   Changed by:  Aiden Villa MD        Dose:  500 mg   Take 1 tablet (500 mg) by mouth 2 times daily   Quantity:  1 tablet   Refills:  0       " * ciprofloxacin 500 MG tablet   Commonly known as:  CIPRO   This may have changed:  You were already taking a medication with the same name, and this prescription was added. Make sure you understand how and when to take each.   Used for:  Prophylactic antibiotic   Changed by:  Aiden Villa MD        Dose:  500 mg   Take 1 tablet (500 mg) by mouth once for 1 dose   Quantity:  1 tablet   Refills:  0       * ciprofloxacin 500 MG tablet   Commonly known as:  CIPRO   This may have changed:  You were already taking a medication with the same name, and this prescription was added. Make sure you understand how and when to take each.   Used for:  Malignant neoplasm of lateral wall of urinary bladder (H)   Changed by:  Aiden Villa MD        Dose:  500 mg   Take 1 tablet (500 mg) by mouth 2 times daily   Quantity:  1 tablet   Refills:  0       * Notice:  This list has 3 medication(s) that are the same as other medications prescribed for you. Read the directions carefully, and ask your doctor or other care provider to review them with you.         Where to get your medicines      These medications were sent to [facility] [place], [place] - [address]  [address], [facility] [address]     Phone:  [phone]     ciprofloxacin 500 MG tablet    ciprofloxacin 500 MG tablet                Primary Care Provider Office Phone # Fax #    Dayron Michael Grewal [phone] [address] [address] [place] [address]        Equal Access to Services     [facility] : Hadii lois frasero Soviridiana, waaxda luqadaha, qaybta kaalmada oliva, karen win . So [facility] [phone].    ATENCIÓN: Si habla español, tiene a reid disposición servicios gratuitos de asistencia lingüística. Llame al [phone].    We comply with applicable federal civil rights laws and [place] laws. We do not discriminate on the basis of race, color, national origin,  age, disability, sex, sexual orientation, or gender identity.            Thank you!     Thank you for choosing [facility] [place]  for your care. Our goal is always to provide you with excellent care. Hearing back from our patients is one way we can continue to improve our services. Please take a few minutes to complete the written survey that you may receive in the mail after your visit with us. Thank you!             Your Updated Medication List - Protect others around you: Learn how to safely use, store and throw away your medicines at www.disposemymeds.org.          This list is accurate as of: 1/8/18 10:02 AM.  Always use your most recent med list.                   Brand Name Dispense Instructions for use Diagnosis    albuterol 108 (90 BASE) MCG/ACT Inhaler    PROAIR HFA/PROVENTIL HFA/VENTOLIN HFA    1 Inhaler    Inhale 2 puffs into the lungs every 6 hours    Mild intermittent asthma without complication       * ciprofloxacin 500 MG tablet    CIPRO    1 tablet    Take 1 tablet (500 mg) by mouth 2 times daily    Personal history of malignant neoplasm of bladder       * ciprofloxacin 500 MG tablet    CIPRO    1 tablet    Take 1 tablet (500 mg) by mouth once for 1 dose    Prophylactic antibiotic       * ciprofloxacin 500 MG tablet    CIPRO    1 tablet    Take 1 tablet (500 mg) by mouth 2 times daily    Malignant neoplasm of lateral wall of urinary bladder (H)       lisinopril 10 MG tablet    PRINIVIL/ZESTRIL    90 tablet    Take 1 tablet (10 mg) by mouth daily    Benign hypertension       * Notice:  This list has 3 medication(s) that are the same as other medications prescribed for you. Read the directions carefully, and ask your doctor or other care provider to review them with you.

## 2018-01-08 NOTE — PROGRESS NOTES
This very pleasant 47-year-old gentleman who is returning for cystoscopy today.  He has a history extending that 4 years of low-grade superficial bladder cancer first diagnosed in 2014.  There was a small low-grade recurrence 6 months later, with a small recurrence noted 6 months later also low-grade and another about 18 months after that of similar characteristics.  He has now had successive six-month checks that have been negative and returns today for a check cystoscopy.     Procedure.  Cystoscopy.   Surgeon.    Daisy  Anesthesia.  Local anesthesia.  Description.  With the patient in the supine position, with the genital area prepped and draped in the customary fashion, with local anesthetic and the urethra, the flexible cystoscope was Inserted.  Penile urethra is normal.  External sphincter is intact.  Prostatic urethra is open with minimal hyperplasia.  Interior of the bladder is carefully inspected.  A small, 0.5 cm small recurrent tumor is seen on the posterior wall of the bladder towards the right side of the trigone.  No other lesions are identified.  There is no evidence of stone formation.  There are no other remarkable features.    Impression.  We will need to treat this small recurrence in the office in the usual fashion by cystoscopy with biopsy and fulguration.  I did discuss this carefully with the patient is explained the situation.  I do not think this is going to affect the overall very good prognosis in any way.  I did go over the procedure with the patient in detail that will be required.  We did have a full discussion also about the need for long-term surveillance another potential additional treatment options that we may consider.  It is possible we may need to discuss the use of intravesical therapy in addition.  I reviewed the records in detail.  I answered all his questions.    Plan.  Cystoscopy with bladder biopsy and fulguration in the office in the near future.    Time.  10 minutes  "required in addition to the procedure and also to have a full discussion regarding the findings, the therapeutic measures required another potential therapeutic options that may be considered    \"This dictation was performed with voice recognition software and may contain errors,  omissions and inadvertent word substitution.\"      "

## 2018-01-08 NOTE — PATIENT INSTRUCTIONS
"     AFTER YOUR CYSTOSCOPY         You have just completed a cystoscopy, or \"cysto\", which allowed your physician to learn more about your bladder (or to remove a stent placed after surgery). We suggest that you continue to avoid caffeine, fruit juice, and alcohol for the next 24 hours, however, you are encouraged to return to your normal activities.       A few things that are considered normal after your cystoscopy:    * small amount of bleeding (or spotting) that clears within the next 24 hours    * slight burning sensation with urination    * sensation to of needing to avoid more frequently    * the feeling of \"air\" in your urine    * mild discomfort that is relieved with Tylonol        Please contact our office promptly if you:    * develop a fever above 101 degrees    * are unable to urinate    * develop bright red blood that does not stop    * severe pain or swelling        And of course, please contact our office with any concerns or questions [phone]      AFTER YOUR CYSTOSCOPY        You have just completed a cystoscopy, or \"cysto\", which allowed your physician to learn more about your bladder (or to remove a stent placed after surgery). We suggest that you continue to avoid caffeine, fruit juice, and alcohol for the next 24 hours, however, you are encouraged to return to your normal activities.         A few things that are considered normal after your cystoscopy:     * Small amount of bleeding (or spotting) that clears within the next 24 hours     * Slight burning sensation with urination     * Sensation to of needing to avoid more frequently     * The feeling of \"air\" in your urine     * Mild discomfort that is relieved with Tylenol        Please contact our office promptly if you:     * Develop a fever above 101 degrees     * Are unable to urinate     * Develop bright red blood that does not stop     * Severe pain or swelling         Please contact our office with any concerns or questions " @[facility].        Slime Lan MA

## 2018-01-08 NOTE — LETTER
1/8/2018       RE: Adarsh Lozano  [address]  [place] [address]     Dear Colleague,    Thank you for referring your patient, Adarsh Lozano, to the [facility] [place] at [facility]. Please see a copy of my visit note below.    This very pleasant 47-year-old gentleman who is returning for cystoscopy today.  He has a history extending that 4 years of low-grade superficial bladder cancer first diagnosed in 2014.  There was a small low-grade recurrence 6 months later, with a small recurrence noted 6 months later also low-grade and another about 18 months after that of similar characteristics.  He has now had successive six-month checks that have been negative and returns today for a check cystoscopy.     Procedure.  Cystoscopy.   Surgeon.    Daisy  Anesthesia.  Local anesthesia.  Description.  With the patient in the supine position, with the genital area prepped and draped in the customary fashion, with local anesthetic and the urethra, the flexible cystoscope was Inserted.  Penile urethra is normal.  External sphincter is intact.  Prostatic urethra is open with minimal hyperplasia.  Interior of the bladder is carefully inspected.  A small, 0.5 cm small recurrent tumor is seen on the posterior wall of the bladder towards the right side of the trigone.  No other lesions are identified.  There is no evidence of stone formation.  There are no other remarkable features.    Impression.  We will need to treat this small recurrence in the office in the usual fashion by cystoscopy with biopsy and fulguration.  I did discuss this carefully with the patient is explained the situation.  I do not think this is going to affect the overall very good prognosis in any way.  I did go over the procedure with the patient in detail that will be required.  We did have a full discussion also about the need for long-term surveillance another potential  "additional treatment options that we may consider.  It is possible we may need to discuss the use of intravesical therapy in addition.  I reviewed the records in detail.  I answered all his questions.    Plan.  Cystoscopy with bladder biopsy and fulguration in the office in the near future.    Time.  10 minutes required in addition to the procedure and also to have a full discussion regarding the findings, the therapeutic measures required another potential therapeutic options that may be considered    \"This dictation was performed with voice recognition software and may contain errors,  omissions and inadvertent word substitution.\"        Again, thank you for allowing me to participate in the care of your patient.      Sincerely,    Aiden Villa MD      "

## 2018-02-08 ENCOUNTER — OFFICE VISIT (OUTPATIENT)
Dept: UROLOGY | Facility: CLINIC | Age: 48
End: 2018-02-08
Payer: COMMERCIAL

## 2018-02-08 VITALS
BODY MASS INDEX: 28.79 KG/M2 | DIASTOLIC BLOOD PRESSURE: 60 MMHG | HEART RATE: 80 BPM | SYSTOLIC BLOOD PRESSURE: 142 MMHG | HEIGHT: 68 IN | WEIGHT: 190 LBS

## 2018-02-08 DIAGNOSIS — Z85.51 PERSONAL HISTORY OF MALIGNANT NEOPLASM OF BLADDER: Primary | ICD-10-CM

## 2018-02-08 LAB
ALBUMIN UR-MCNC: ABNORMAL MG/DL
APPEARANCE UR: CLEAR
BILIRUB UR QL STRIP: NEGATIVE
COLOR UR AUTO: YELLOW
GLUCOSE UR STRIP-MCNC: NEGATIVE MG/DL
HGB UR QL STRIP: NEGATIVE
KETONES UR STRIP-MCNC: ABNORMAL MG/DL
LEUKOCYTE ESTERASE UR QL STRIP: NEGATIVE
NITRATE UR QL: NEGATIVE
PH UR STRIP: 6 PH (ref 5–7)
SOURCE: ABNORMAL
SP GR UR STRIP: >1.03 (ref 1–1.03)
UROBILINOGEN UR STRIP-ACNC: 0.2 EU/DL (ref 0.2–1)

## 2018-02-08 PROCEDURE — 81003 URINALYSIS AUTO W/O SCOPE: CPT | Performed by: UROLOGY

## 2018-02-08 PROCEDURE — 88305 TISSUE EXAM BY PATHOLOGIST: CPT | Performed by: UROLOGY

## 2018-02-08 PROCEDURE — 52224 CYSTOSCOPY AND TREATMENT: CPT | Performed by: UROLOGY

## 2018-02-08 ASSESSMENT — PAIN SCALES - GENERAL
PAINLEVEL: NO PAIN (0)
PAINLEVEL: NO PAIN (0)

## 2018-02-08 NOTE — NURSING NOTE
Chief Complaint   Patient presents with     Cystoscopy     Bladder Biopsy/Fulguration    History of Bladder Cancer   Alison Zhang LPN

## 2018-02-08 NOTE — PATIENT INSTRUCTIONS

## 2018-02-08 NOTE — MR AVS SNAPSHOT
"              After Visit Summary   2/8/2018    Adarsh Lozano    MRN: [id number]           Patient Information     Date Of Birth          1970        Visit Information        Provider Department      2/8/2018 10:00 AM Aiden Villa MD; [facility] ROOM; [facility] [place]        Today's Diagnoses     Personal history of malignant neoplasm of bladder    -  1      Care Instructions         AFTER YOUR CYSTOSCOPY         You have just completed a cystoscopy, or \"cysto\", which allowed your physician to learn more about your bladder (or to remove a stent placed after surgery). We suggest that you continue to avoid caffeine, fruit juice, and alcohol for the next 24 hours, however, you are encouraged to return to your normal activities.       A few things that are considered normal after your cystoscopy:    * small amount of bleeding (or spotting) that clears within the next 24 hours    * slight burning sensation with urination    * sensation to of needing to avoid more frequently    * the feeling of \"air\" in your urine    * mild discomfort that is relieved with Tylonol        Please contact our office promptly if you:    * develop a fever above 101 degrees    * are unable to urinate    * develop bright red blood that does not stop    * severe pain or swelling        And of course, please contact our office with any concerns or questions [phone]        AFTER YOUR CYSTOSCOPY        You have just completed a cystoscopy, or \"cysto\", which allowed your physician to learn more about your bladder (or to remove a stent placed after surgery). We suggest that you continue to avoid caffeine, fruit juice, and alcohol for the next 24 hours, however, you are encouraged to return to your normal activities.         A few things that are considered normal after your cystoscopy:     * Small amount of bleeding (or spotting) that clears within the next 24 hours     * Slight burning " "sensation with urination     * Sensation to of needing to avoid more frequently     * The feeling of \"air\" in your urine     * Mild discomfort that is relieved with Tylenol        Please contact our office promptly if you:     * Develop a fever above 101 degrees     * Are unable to urinate     * Develop bright red blood that does not stop     * Severe pain or swelling         Please contact our office with any concerns or questions @[facility].          Follow-ups after your visit        Follow-up notes from your care team     Return in 3 months (on 5/8/2018).      Your next 10 appointments already scheduled     Feb 15, 2018  2:30 PM CST   Return Visit with Aiden Villa MD   [facility] [place] ([facility])    [address]  [address]  [place] [facility] [address] [phone]              Who to contact     If you have questions or need follow up information about today's clinic visit or your schedule please contact [facility] [place] directly at [phone].  Normal or non-critical lab and imaging results will be communicated to you by Mallstreethart, letter or phone within 4 business days after the clinic has received the results. If you do not hear from us within 7 days, please contact the clinic through [employer] or phone. If you have a critical or abnormal lab result, we will notify you by phone as soon as possible.  Submit refill requests through [employer] or call your pharmacy and they will forward the refill request to us. Please allow 3 business days for your refill to be completed.          Additional Information About Your Visit        [employer] Information     [employer] lets you send messages to your doctor, view your test results, renew your prescriptions, schedule appointments and more. To sign up, go to [email or web address] . Click on \"Log in\" on the left side of the screen, which will take you to the Welcome page. Then click on " "\"Sign up Now\" on the right side of the page.     You will be asked to enter the access code listed below, as well as some personal information. Please follow the directions to create your username and password.     Your access code is: [email or web address]  Expires: 3/11/2018  2:06 PM     Your access code will  in 90 days. If you need help or a new code, please call your [facility] clinic or [phone].        Care EveryWhere ID     This is your Care EveryWhere ID. This could be used by other organizations to access your [facility] medical records  [id number]        Your Vitals Were     Pulse Height BMI (Body Mass Index)             80 1.727 m (5' 8\") 28.89 kg/m2          Blood Pressure from Last 3 Encounters:   18 142/60   18 (!) 128/100   17 126/80    Weight from Last 3 Encounters:   18 86.2 kg (190 lb)   18 86.2 kg (190 lb)   17 88.3 kg (194 lb 9.6 oz)              We Performed the Following     CYSTOSCOPY W TX MINOR LESION <0.5 CM     Surgical pathology exam [[id number]]     UA without Microscopic        Primary Care Provider Office Phone # Fax #    Dayron Michael Grewal [phone] [address] [facility] [address]        Equal Access to Services     [facility]: Hadii aad ku hadasho Soomaali, waaxda luqadaha, qaybta kaalmada adeegyada, karen win . So [facility] [phone].    ATENCIÓN: Si habla español, tiene a reid disposición servicios gratuitos de asistencia lingüística. Llame al [phone].    We comply with applicable federal civil rights laws and [place] laws. We do not discriminate on the basis of race, color, national origin, age, disability, sex, sexual orientation, or gender identity.            Thank you!     Thank you for choosing [facility] [place]  for your care. Our goal is always to provide you with excellent care. Hearing back from our patients is one way we can continue " to improve our services. Please take a few minutes to complete the written survey that you may receive in the mail after your visit with us. Thank you!             Your Updated Medication List - Protect others around you: Learn how to safely use, store and throw away your medicines at www.disposemymeds.org.          This list is accurate as of 2/8/18  1:25 PM.  Always use your most recent med list.                   Brand Name Dispense Instructions for use Diagnosis    albuterol 108 (90 BASE) MCG/ACT Inhaler    PROAIR HFA/PROVENTIL HFA/VENTOLIN HFA    1 Inhaler    Inhale 2 puffs into the lungs every 6 hours    Mild intermittent asthma without complication       AMOXICILLIN PO      Take 875 mg by mouth        * ciprofloxacin 500 MG tablet    CIPRO    1 tablet    Take 1 tablet (500 mg) by mouth 2 times daily    Personal history of malignant neoplasm of bladder       * ciprofloxacin 500 MG tablet    CIPRO    1 tablet    Take 1 tablet (500 mg) by mouth 2 times daily    Malignant neoplasm of lateral wall of urinary bladder (H)       lisinopril 10 MG tablet    PRINIVIL/ZESTRIL    90 tablet    Take 1 tablet (10 mg) by mouth daily    Benign hypertension       * Notice:  This list has 2 medication(s) that are the same as other medications prescribed for you. Read the directions carefully, and ask your doctor or other care provider to review them with you.

## 2018-02-08 NOTE — PROGRESS NOTES
"This very pleasant 47-year-old gentleman returns today for treatment of a small recurrent bladder tumor with a history of low-grade superficial disease.     Procedure.  Cystoscopy with biopsy and fulguration of small bladder tumor   Surgeon.    Daisy  Anesthesia.  Local anesthesia.  Description.  With the patient in the supine position, with the genital area prepped and draped in the customary fashion, with local anesthetic and the urethra, the flexible cystoscope was Inserted.  The penile urethra is normal.  The external status intact.  There is no significant enlargement of the prostate.  The interior the bladder inspected.  The small tumors identified on the posterior wall of the bladder just to the left of the midline.  I biopsied this with a small cold cup biopsy instrument and this specimen will be sent for histological analysis.  I then reinserted the cystoscope, and using a small Bugbee electrode carefully coagulated the tumor.  There were no other remarkable features to report.    Impression.  I discussed the situation in detail with the patient following the procedure.  I explained that he may see a little blood in the urine for 1 or 2 days.  I explained to that I would need to follow him closely and I would like to repeat cystoscopy in 3 months that further tumors could develop in the future and that we may need to consider other potential treatment alternatives should this become problematic.  He understands this clearly.  I discussed the entire situation with the patient in detail.  I answered all his questions.    Plan.  Cystoscopy with urine cytology in 3 months    Time.  10 minutes was spent in addition to procedure as noted above for the reasons as noted above.    \"This dictation was performed with voice recognition software and may contain errors,  omissions and inadvertent word substitution.\"    "

## 2018-02-12 LAB — COPATH REPORT: NORMAL

## 2018-05-17 ENCOUNTER — OFFICE VISIT (OUTPATIENT)
Dept: UROLOGY | Facility: CLINIC | Age: 48
End: 2018-05-17
Payer: COMMERCIAL

## 2018-05-17 VITALS
SYSTOLIC BLOOD PRESSURE: 131 MMHG | DIASTOLIC BLOOD PRESSURE: 82 MMHG | WEIGHT: 190 LBS | BODY MASS INDEX: 28.79 KG/M2 | HEIGHT: 68 IN

## 2018-05-17 DIAGNOSIS — C67.9 BLADDER CANCER (H): Primary | ICD-10-CM

## 2018-05-17 DIAGNOSIS — Z79.2 PROPHYLACTIC ANTIBIOTIC: ICD-10-CM

## 2018-05-17 DIAGNOSIS — C67.2 MALIGNANT NEOPLASM OF LATERAL WALL OF URINARY BLADDER (H): Primary | ICD-10-CM

## 2018-05-17 LAB
ALBUMIN UR-MCNC: ABNORMAL MG/DL
APPEARANCE UR: CLEAR
BILIRUB UR QL STRIP: NEGATIVE
COLOR UR AUTO: YELLOW
GLUCOSE UR STRIP-MCNC: NEGATIVE MG/DL
HGB UR QL STRIP: NEGATIVE
KETONES UR STRIP-MCNC: NEGATIVE MG/DL
LEUKOCYTE ESTERASE UR QL STRIP: NEGATIVE
NITRATE UR QL: NEGATIVE
PH UR STRIP: 5.5 PH (ref 5–7)
SOURCE: ABNORMAL
SP GR UR STRIP: 1.02 (ref 1–1.03)
UROBILINOGEN UR STRIP-ACNC: 0.2 EU/DL (ref 0.2–1)

## 2018-05-17 PROCEDURE — 52000 CYSTOURETHROSCOPY: CPT | Performed by: UROLOGY

## 2018-05-17 PROCEDURE — 99212 OFFICE O/P EST SF 10 MIN: CPT | Mod: 25 | Performed by: UROLOGY

## 2018-05-17 PROCEDURE — 81003 URINALYSIS AUTO W/O SCOPE: CPT | Performed by: UROLOGY

## 2018-05-17 PROCEDURE — 88112 CYTOPATH CELL ENHANCE TECH: CPT | Performed by: UROLOGY

## 2018-05-17 RX ORDER — CIPROFLOXACIN 500 MG/1
500 TABLET, FILM COATED ORAL ONCE
Qty: 1 TABLET | Refills: 0 | Status: SHIPPED | OUTPATIENT
Start: 2018-05-17 | End: 2019-02-20

## 2018-05-17 ASSESSMENT — PAIN SCALES - GENERAL: PAINLEVEL: NO PAIN (0)

## 2018-05-17 NOTE — PROGRESS NOTES
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      FINAL DIAGNOSIS:   Bladder, biopsy: Scant superficial urothelial mucosa, negative for   high-grade lesion.  See description.     Electronically signed out by:     Ebenezer Dawkins M.D.     Fortunately this biopsy appears to be benign.  There is no evidence of high-grade disease.  We therefore however we will continue careful surveillance and he returns today 3 months later for check cystoscopy in the office.     Procedure.  Cystoscopy.   Surgeon.    Daisy  Anesthesia.  Local anesthesia.  Description.  With the patient in the supine position, with the genital area prepped and draped in the customary fashion, with local anesthetic and the urethra, the flexible cystoscope was Inserted.  The penile urethra is normal.  The external sphincter is intact.  Prostatic urethra is not obstructed.  Into the bladder carefully inspected.  There is no significant trabeculation.  There is no evidence of stone formation.  There is a very small inflamed area slightly lateral to the left ureteric orifice which does not appear sinister at the present time.  There are no other remarkable features.    Impression.  I want to keep a close eye on his bladder undergoing a repeat the cystoscopy in 6 months I have advised the patient that we will continue to monitor this area on the bladder that we recall that last time we biopsied anything suspicious in the bladder it turned out to be quite benign.  I did carefully explain the entire situation to the patient in detail today I did explain to him the importance of careful surveillance given his past history.  I answered all his questions.    Plan.  6 months for cystoscopy and urine cytology.    Time.  10 minutes was spent in  "addition to cystoscopy in order to discuss the findings, to talk about the need for close follow-up to talk about potential alternative treatment strategies.    \"This dictation was performed with voice recognition software and may contain errors,  omissions and inadvertent word substitution.\"    "

## 2018-05-17 NOTE — MR AVS SNAPSHOT
"              After Visit Summary   5/17/2018    Adarsh Lozano    MRN: [id number]           Patient Information     Date Of Birth          1970        Visit Information        Provider Department      5/17/2018 3:00 PM Aiden Villa MD; [facility] [place]        Today's Diagnoses     Malignant neoplasm of lateral wall of urinary bladder (H)    -  1       Follow-ups after your visit        Follow-up notes from your care team     Return in about 6 months (around 11/17/2018) for cystoscopy, cytology.      Your next 10 appointments already scheduled     Nov 19, 2018 10:00 AM CST   Cystoscopy with Aiden Villa MD, [facility] [place] ([facility] [place])    [address]  [address]  [facility] [address] [phone]              Who to contact     If you have questions or need follow up information about today's clinic visit or your schedule please contact [facility] directly at [phone].  Normal or non-critical lab and imaging results will be communicated to you by MyChart, letter or phone within 4 business days after the clinic has received the results. If you do not hear from us within 7 days, please contact the clinic through MyChart or phone. If you have a critical or abnormal lab result, we will notify you by phone as soon as possible.  Submit refill requests through RxApps or call your pharmacy and they will forward the refill request to us. Please allow 3 business days for your refill to be completed.          Additional Information About Your Visit        MyChart Information     RxApps lets you send messages to your doctor, view your test results, renew your prescriptions, schedule appointments and more. To sign up, go to [email or web address] . Click on \"Log in\" on the left side of the screen, which will take you to the Welcome page. Then click on " "\"Sign up Now\" on the right side of the page.     You will be asked to enter the access code listed below, as well as some personal information. Please follow the directions to create your username and password.     Your access code is: [email or web address]  Expires: 8/15/2018  4:00 PM     Your access code will  in 90 days. If you need help or a new code, please call your [place] clinic or [phone].        Care EveryWhere ID     This is your Care EveryWhere ID. This could be used by other organizations to access your [place] medical records  [id number]        Your Vitals Were     Height BMI (Body Mass Index)                1.727 m (5' 8\") 28.89 kg/m2           Blood Pressure from Last 3 Encounters:   18 131/82   18 142/60   18 (!) 128/100    Weight from Last 3 Encounters:   18 86.2 kg (190 lb)   18 86.2 kg (190 lb)   18 86.2 kg (190 lb)              We Performed the Following     CYSTOURETHROSCOPY (08973)     Cytology non gyn [[id number]]     UA without Microscopic        Primary Care Provider Office Phone # Fax #    Dayron Grewal [phone] [address] [facility] [address]        Equal Access to Services     MARQUITA MAN : Hadii lois solano hadasho Soviridiana, waaxda luqadaha, qaybta kaalmada oliva, karen win . So [facility] [phone].    ATENCIÓN: Si habla español, tiene a reid disposición servicios gratuitos de asistencia lingüística. Llame al [phone].    We comply with applicable federal civil rights laws and [place] laws. We do not discriminate on the basis of race, color, national origin, age, disability, sex, sexual orientation, or gender identity.            Thank you!     Thank you for choosing [facility] [place]  for your care. Our goal is always to provide you with excellent care. Hearing back from our patients is one way we can continue to improve our services. Please " take a few minutes to complete the written survey that you may receive in the mail after your visit with us. Thank you!             Your Updated Medication List - Protect others around you: Learn how to safely use, store and throw away your medicines at www.disposemymeds.org.          This list is accurate as of 5/17/18  4:00 PM.  Always use your most recent med list.                   Brand Name Dispense Instructions for use Diagnosis    albuterol 108 (90 Base) MCG/ACT Inhaler    PROAIR HFA/PROVENTIL HFA/VENTOLIN HFA    1 Inhaler    Inhale 2 puffs into the lungs every 6 hours    Mild intermittent asthma without complication       AMOXICILLIN PO      Take 875 mg by mouth        * ciprofloxacin 500 MG tablet    CIPRO    1 tablet    Take 1 tablet (500 mg) by mouth 2 times daily    Personal history of malignant neoplasm of bladder       * ciprofloxacin 500 MG tablet    CIPRO    1 tablet    Take 1 tablet (500 mg) by mouth 2 times daily    Malignant neoplasm of lateral wall of urinary bladder (H)       lisinopril 10 MG tablet    PRINIVIL/ZESTRIL    90 tablet    Take 1 tablet (10 mg) by mouth daily    Benign hypertension       * Notice:  This list has 2 medication(s) that are the same as other medications prescribed for you. Read the directions carefully, and ask your doctor or other care provider to review them with you.

## 2018-05-17 NOTE — LETTER
5/17/2018       RE: Adarsh Lozano  [address]  [facility] [address]     Dear Colleague,    Thank you for referring your patient, Adarsh Lozano, to the [facility] [place] [facility]. Please see a copy of my visit note below.    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      FINAL DIAGNOSIS:   Bladder, biopsy: Scant superficial urothelial mucosa, negative for   high-grade lesion.  See description.     Electronically signed out by:     Ebenezer Dawkins M.D.     Fortunately this biopsy appears to be benign.  There is no evidence of high-grade disease.  We therefore however we will continue careful surveillance and he returns today 3 months later for check cystoscopy in the office.     Procedure.  Cystoscopy.   Surgeon.    Daisy  Anesthesia.  Local anesthesia.  Description.  With the patient in the supine position, with the genital area prepped and draped in the customary fashion, with local anesthetic and the urethra, the flexible cystoscope was Inserted.  The penile urethra is normal.  The external sphincter is intact.  Prostatic urethra is not obstructed.  Into the bladder carefully inspected.  There is no significant trabeculation.  There is no evidence of stone formation.  There is a very small inflamed area slightly lateral to the left ureteric orifice which does not appear sinister at the present time.  There are no other remarkable features.    Impression.  I want to keep a close eye on his bladder undergoing a repeat the cystoscopy in 6 months I have advised the patient that we will continue to monitor this area on the bladder that we recall that last time we biopsied anything suspicious in the bladder it  "turned out to be quite benign.  I did carefully explain the entire situation to the patient in detail today I did explain to him the importance of careful surveillance given his past history.  I answered all his questions.    Plan.  6 months for cystoscopy and urine cytology.    Time.  10 minutes was spent in addition to cystoscopy in order to discuss the findings, to talk about the need for close follow-up to talk about potential alternative treatment strategies.    \"This dictation was performed with voice recognition software and may contain errors,  omissions and inadvertent word substitution.\"      Again, thank you for allowing me to participate in the care of your patient.      Sincerely,    Aiden Villa MD      "

## 2018-05-18 LAB — COPATH REPORT: NORMAL

## 2018-10-11 DIAGNOSIS — J45.20 MILD INTERMITTENT ASTHMA WITHOUT COMPLICATION: ICD-10-CM

## 2018-10-12 RX ORDER — ALBUTEROL SULFATE 90 UG/1
AEROSOL, METERED RESPIRATORY (INHALATION)
Qty: 8.5 G | Refills: 1 | Status: SHIPPED | OUTPATIENT
Start: 2018-10-12 | End: 2019-02-20

## 2018-10-12 NOTE — TELEPHONE ENCOUNTER
"Requested Prescriptions   Pending Prescriptions Disp Refills     PROAIR  (90 Base) MCG/ACT inhaler [Pharmacy Med Name: ALBUTEROL(PROAIR)HFA INH 90MCG]    Last Written Prescription Date:  12/11/17  Last Fill Quantity: 1 inhaler,  # refills: 3   Last office visit: 12/11/2017 with prescribing provider:  Carmina   Future Office Visit:     8.5 g 3     Sig: USE 2 INHALATIONS EVERY 6 HOURS    Asthma Maintenance Inhalers - Anticholinergics Failed    10/11/2018  9:21 PM       Failed - Asthma control assessment score within normal limits in last 6 months    Please review ACT score.   ACT Total Scores 9/24/2012 2/14/2014 12/11/2017   ACT TOTAL SCORE 25 23 -   ASTHMA ER VISITS 0 = None 0 = None -   ASTHMA HOSPITALIZATIONS 0 = None 0 = None -   ACT TOTAL SCORE (Goal Greater than or Equal to 20) - - 23   In the past 12 months, how many times did you visit the emergency room for your asthma without being admitted to the hospital? - - 0   In the past 12 months, how many times were you hospitalized overnight because of your asthma? - - 0          Failed - Recent (6 mo) or future (30 days) visit within the authorizing provider's specialty    Patient had office visit in the last 6 months or has a visit in the next 30 days with authorizing provider or within the authorizing provider's specialty.  See \"Patient Info\" tab in inbasket, or \"Choose Columns\" in Meds & Orders section of the refill encounter.           Passed - Patient is age 12 years or older          "

## 2018-11-14 DIAGNOSIS — C67.9 BLADDER CANCER (H): Primary | ICD-10-CM

## 2018-11-19 ENCOUNTER — OFFICE VISIT (OUTPATIENT)
Dept: UROLOGY | Facility: CLINIC | Age: 48
End: 2018-11-19
Payer: COMMERCIAL

## 2018-11-19 VITALS
BODY MASS INDEX: 29.03 KG/M2 | SYSTOLIC BLOOD PRESSURE: 140 MMHG | DIASTOLIC BLOOD PRESSURE: 90 MMHG | HEIGHT: 67 IN | WEIGHT: 185 LBS | HEART RATE: 80 BPM

## 2018-11-19 DIAGNOSIS — C67.9 BLADDER CANCER (H): Primary | ICD-10-CM

## 2018-11-19 DIAGNOSIS — C67.2 MALIGNANT NEOPLASM OF LATERAL WALL OF URINARY BLADDER (H): ICD-10-CM

## 2018-11-19 LAB
ALBUMIN UR-MCNC: NEGATIVE MG/DL
APPEARANCE UR: CLEAR
BILIRUB UR QL STRIP: NEGATIVE
COLOR UR AUTO: YELLOW
GLUCOSE UR STRIP-MCNC: NEGATIVE MG/DL
HGB UR QL STRIP: NEGATIVE
KETONES UR STRIP-MCNC: NEGATIVE MG/DL
LEUKOCYTE ESTERASE UR QL STRIP: NEGATIVE
NITRATE UR QL: NEGATIVE
PH UR STRIP: 6 PH (ref 5–7)
SOURCE: NORMAL
SP GR UR STRIP: >1.03 (ref 1–1.03)
UROBILINOGEN UR STRIP-ACNC: 0.2 EU/DL (ref 0.2–1)

## 2018-11-19 PROCEDURE — 52000 CYSTOURETHROSCOPY: CPT | Performed by: UROLOGY

## 2018-11-19 PROCEDURE — 81003 URINALYSIS AUTO W/O SCOPE: CPT | Performed by: UROLOGY

## 2018-11-19 RX ORDER — CIPROFLOXACIN 500 MG/1
500 TABLET, FILM COATED ORAL 2 TIMES DAILY
Qty: 1 TABLET | Refills: 0 | Status: SHIPPED | OUTPATIENT
Start: 2018-11-19 | End: 2019-02-20

## 2018-11-19 RX ORDER — CIPROFLOXACIN 500 MG/1
500 TABLET, FILM COATED ORAL ONCE
Qty: 1 TABLET | Refills: 0 | Status: SHIPPED | OUTPATIENT
Start: 2018-11-19 | End: 2019-02-20

## 2018-11-19 ASSESSMENT — PAIN SCALES - GENERAL: PAINLEVEL: NO PAIN (0)

## 2018-11-19 NOTE — LETTER
11/19/2018       RE: Adarsh Lozano  [address]  [place] [address]     Dear Colleague,    Thank you for referring your patient, Adarsh Lozano, to the [facility] [place] at [facility]. Please see a copy of my visit note below.    This very pleasant 48-year-old gentleman returns today for check cystoscopy.  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  Further small recurrence was noted 6 months later, also low-grade and then subsequently another recurrence 2 years after that which was also small and low-grade noninvasive.  He has no symptoms at this time.  He is in good health.     Procedure.  Cystoscopy.   Surgeon.    Daisy  Anesthesia.  Local anesthesia.  Description.  With the patient in the supine position, with the genital area prepped and draped in the customary fashion, with local anesthetic and the urethra, the flexible cystoscope was Inserted.  The penile urethra is normal.  The external sphincter is intact.  When viewed from verumontanum with only minimal enlargement of the prostate.  Interior of the bladder is carefully inspected.  There is one very small recurrent tumor seen on the left lateral wall of the bladder above and lateral to the left ureteric orifice.  I can identify no other tumors in the bladder.  There are no other remarkable findings.    Impression.  We will need to arrange for cystoscopy with bladder biopsy and fulguration in the office.  This lesion is very small, almost certainly completely low-grade and noninvasive and possibly even benign.  I did discuss this carefully with the patient in detail today.  I carefully explained that this is not unexpected in patients who have low-grade superficial bladder cancer, and is unlikely to affect what is essentially going to be a very good prognosis.  I did  "however emphasized the importance of continued surveillance and we did discuss the possibility of additional treatment even including intravesical therapy if we have evidence of increasing frequency of recurrence in the future.  I did discuss the entire situation with the patient in detail today.  I answered all questions    Plan.  Cystoscopy with bladder biopsy and fulguration in the office in the near future    Time.  15 minutes, additional time in addition to the procedure was required to explain the findings, to review the records regarding previous procedures, to go over previous pathology reports, and to discuss in detail the measures that will be required in the near future to deal with the recurrent tumor in the bladder,, discussions about possible additional therapy and about the importance of long-term surveillance.    \"This dictation was performed with voice recognition software and may contain errors,  omissions and inadvertent word substitution.\"      Again, thank you for allowing me to participate in the care of your patient.      Sincerely,    Aiden Villa MD      "

## 2018-11-19 NOTE — PROGRESS NOTES
This very pleasant 48-year-old gentleman returns today for check cystoscopy.  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  Further small recurrence was noted 6 months later, also low-grade and then subsequently another recurrence 2 years after that which was also small and low-grade noninvasive.  He has no symptoms at this time.  He is in good health.     Procedure.  Cystoscopy.   Surgeon.    Daisy  Anesthesia.  Local anesthesia.  Description.  With the patient in the supine position, with the genital area prepped and draped in the customary fashion, with local anesthetic and the urethra, the flexible cystoscope was Inserted.  The penile urethra is normal.  The external sphincter is intact.  When viewed from verumontanum with only minimal enlargement of the prostate.  Interior of the bladder is carefully inspected.  There is one very small recurrent tumor seen on the left lateral wall of the bladder above and lateral to the left ureteric orifice.  I can identify no other tumors in the bladder.  There are no other remarkable findings.    Impression.  We will need to arrange for cystoscopy with bladder biopsy and fulguration in the office.  This lesion is very small, almost certainly completely low-grade and noninvasive and possibly even benign.  I did discuss this carefully with the patient in detail today.  I carefully explained that this is not unexpected in patients who have low-grade superficial bladder cancer, and is unlikely to affect what is essentially going to be a very good prognosis.  I did however emphasized the importance of continued surveillance and we did discuss the possibility of additional treatment even including intravesical therapy if we have evidence of increasing frequency of recurrence in the future.  I did discuss the entire situation with the patient in detail today.  I answered all  "questions    Plan.  Cystoscopy with bladder biopsy and fulguration in the office in the near future    Time.  15 minutes, additional time in addition to the procedure was required to explain the findings, to review the records regarding previous procedures, to go over previous pathology reports, and to discuss in detail the measures that will be required in the near future to deal with the recurrent tumor in the bladder,, discussions about possible additional therapy and about the importance of long-term surveillance.    \"This dictation was performed with voice recognition software and may contain errors,  omissions and inadvertent word substitution.\"    "

## 2018-11-19 NOTE — MR AVS SNAPSHOT
"              After Visit Summary   11/19/2018    Adarsh Lozano    MRN: [id number]           Patient Information     Date Of Birth          1970        Visit Information        Provider Department      11/19/2018 3:00 PM Aiden Villa MD; [facility] [place]        Today's Diagnoses     Malignant neoplasm of lateral wall of urinary bladder (H)          Care Instructions         AFTER YOUR CYSTOSCOPY         You have just completed a cystoscopy, or \"cysto\", which allowed your physician to learn more about your bladder (or to remove a stent placed after surgery). We suggest that you continue to avoid caffeine, fruit juice, and alcohol for the next 24 hours, however, you are encouraged to return to your normal activities.       A few things that are considered normal after your cystoscopy:    * small amount of bleeding (or spotting) that clears within the next 24 hours    * slight burning sensation with urination    * sensation to of needing to avoid more frequently    * the feeling of \"air\" in your urine    * mild discomfort that is relieved with Tylonol        Please contact our office promptly if you:    * develop a fever above 101 degrees    * are unable to urinate    * develop bright red blood that does not stop    * severe pain or swelling        And of course, please contact our office with any concerns or questions [phone]    AFTER YOUR CYSTOSCOPY        You have just completed a cystoscopy, or \"cysto\", which allowed your physician to learn more about your bladder (or to remove a stent placed after surgery). We suggest that you continue to avoid caffeine, fruit juice, and alcohol for the next 24 hours, however, you are encouraged to return to your normal activities.         A few things that are considered normal after your cystoscopy:     * Small amount of bleeding (or spotting) that clears within the next 24 hours     * Slight burning sensation with " "urination     * Sensation to of needing to avoid more frequently     * The feeling of \"air\" in your urine     * Mild discomfort that is relieved with Tylenol        Please contact our office promptly if you:     * Develop a fever above 101 degrees     * Are unable to urinate     * Develop bright red blood that does not stop     * Severe pain or swelling         Please contact our office with any concerns or questions @[facility].          Follow-ups after your visit        Follow-up notes from your care team     Return for cysto with bladder biopsy etc..      Your next 10 appointments already scheduled     Dec 17, 2018  4:00 PM CST   (Arrive by 3:30 PM)   Cystoscopy with Aiden Villa MD, UA CYF, UA CAUT EQ   [facility] [place] ([facility] [place])    [address]  [address]  [facility] [address] [phone]              Who to contact     If you have questions or need follow up information about today's clinic visit or your schedule please contact [facility] directly at [phone].  Normal or non-critical lab and imaging results will be communicated to you by MyChart, letter or phone within 4 business days after the clinic has received the results. If you do not hear from us within 7 days, please contact the clinic through MyChart or phone. If you have a critical or abnormal lab result, we will notify you by phone as soon as possible.  Submit refill requests through LiveRamp or call your pharmacy and they will forward the refill request to us. Please allow 3 business days for your refill to be completed.          Additional Information About Your Visit        Care EveryWhere ID     This is your Care EveryWhere ID. This could be used by other organizations to access your [place] medical records  [id number]        Your Vitals Were     Pulse Height BMI (Body Mass Index)             80 1.702 m (5' 7\") 28.98 kg/m2          " Blood Pressure from Last 3 Encounters:   11/19/18 140/90   05/17/18 131/82   02/08/18 142/60    Weight from Last 3 Encounters:   11/19/18 83.9 kg (185 lb)   05/17/18 86.2 kg (190 lb)   02/08/18 86.2 kg (190 lb)              We Performed the Following     UA without Microscopic          Today's Medication Changes          These changes are accurate as of 11/19/18  3:36 PM.  If you have any questions, ask your nurse or doctor.               These medicines have changed or have updated prescriptions.        Dose/Directions    * ciprofloxacin 500 MG tablet   Commonly known as:  CIPRO   This may have changed:  Another medication with the same name was added. Make sure you understand how and when to take each.   Used for:  Personal history of malignant neoplasm of bladder   Changed by:  Aiden Villa MD        Dose:  500 mg   Take 1 tablet (500 mg) by mouth 2 times daily   Quantity:  1 tablet   Refills:  0       * ciprofloxacin 500 MG tablet   Commonly known as:  CIPRO   This may have changed:  Another medication with the same name was added. Make sure you understand how and when to take each.   Used for:  Malignant neoplasm of lateral wall of urinary bladder (H)   Changed by:  Aiden Villa MD        Dose:  500 mg   Take 1 tablet (500 mg) by mouth 2 times daily   Quantity:  1 tablet   Refills:  0       * ciprofloxacin 500 MG tablet   Commonly known as:  CIPRO   This may have changed:  You were already taking a medication with the same name, and this prescription was added. Make sure you understand how and when to take each.   Used for:  Bladder cancer (H)   Changed by:  Aiden Villa MD        Dose:  500 mg   Take 1 tablet (500 mg) by mouth once for 1 dose   Quantity:  1 tablet   Refills:  0       * Notice:  This list has 3 medication(s) that are the same as other medications prescribed for you. Read the directions carefully, and ask your doctor or other care provider to review them with you.          Where to get your medicines      These medications were sent to [facility] - [place], [place] - [address]  [address], [place] [address]     Phone:  [phone]     ciprofloxacin 500 MG tablet    ciprofloxacin 500 MG tablet                Primary Care Provider Office Phone # Fax #    Dayron Michael Grewal [phone] [address] [facility] [address]        Equal Access to Services     MARQUITA MAN : Hadii aad ku hadasho Soomaali, waaxda luqadaha, qaybta kaalmada adeegyada, waxay idiin hayaan adeeg kharash la'adaman . So [facility] [phone].    ATENCIÓN: Si habla español, tiene a reid disposición servicios gratuitos de asistencia lingüística. [facility] [phone].    We comply with applicable federal civil rights laws and [place] laws. We do not discriminate on the basis of race, color, national origin, age, disability, sex, sexual orientation, or gender identity.            Thank you!     Thank you for choosing [facility] [place]  for your care. Our goal is always to provide you with excellent care. Hearing back from our patients is one way we can continue to improve our services. Please take a few minutes to complete the written survey that you may receive in the mail after your visit with us. Thank you!             Your Updated Medication List - Protect others around you: Learn how to safely use, store and throw away your medicines at www.disposemymeds.org.          This list is accurate as of 11/19/18  3:36 PM.  Always use your most recent med list.                   Brand Name Dispense Instructions for use Diagnosis    AMOXICILLIN PO      Take 875 mg by mouth        * ciprofloxacin 500 MG tablet    CIPRO    1 tablet    Take 1 tablet (500 mg) by mouth 2 times daily    Personal history of malignant neoplasm of bladder       * ciprofloxacin 500 MG tablet    CIPRO    1 tablet    Take 1 tablet (500 mg) by mouth 2 times  daily    Malignant neoplasm of lateral wall of urinary bladder (H)       * ciprofloxacin 500 MG tablet    CIPRO    1 tablet    Take 1 tablet (500 mg) by mouth once for 1 dose    Bladder cancer (H)       lisinopril 10 MG tablet    PRINIVIL/ZESTRIL    90 tablet    Take 1 tablet (10 mg) by mouth daily    Benign hypertension       PROAIR  (90 Base) MCG/ACT inhaler   Generic drug:  albuterol     8.5 g    USE 2 INHALATIONS EVERY 6 HOURS    Mild intermittent asthma without complication       * Notice:  This list has 3 medication(s) that are the same as other medications prescribed for you. Read the directions carefully, and ask your doctor or other care provider to review them with you.

## 2018-11-19 NOTE — NURSING NOTE
"Chief Complaint   Patient presents with     Cystoscopy     6 month follow-up     Initial /90 (BP Location: Right arm, Patient Position: Sitting, Cuff Size: Adult Regular)  Pulse 80  Ht 1.702 m (5' 7\")  Wt 83.9 kg (185 lb)  BMI 28.98 kg/m2 Estimated body mass index is 28.98 kg/(m^2) as calculated from the following:    Height as of this encounter: 1.702 m (5' 7\").    Weight as of this encounter: 83.9 kg (185 lb).  BP completed using cuff size:regular-right.    Prior to the start of the procedure and with procedural staff participation, I verbally confirmed the patient s identity using two indicators, relevant allergies, that the procedure was appropriate and matched the consent or emergent situation, and that the correct equipment/implants were available. Immediately prior to starting the procedure I conducted the Time Out with the procedural staff and re-confirmed the patient s name, procedure, and site/side. I have wiped the patient off with the povidone-Iodine solution, draped them,  used Lidocaine hydrochloride jelly, and instilled sterile water into the bladder. (The Joint Commission universal protocol was followed.)  Yes    Sedation (Moderate or Deep): None      Anderson RODRIGUEZ  [facility]   November 19, 2018 3:09 PM    "

## 2018-11-19 NOTE — PATIENT INSTRUCTIONS

## 2018-12-17 ENCOUNTER — OFFICE VISIT (OUTPATIENT)
Dept: UROLOGY | Facility: CLINIC | Age: 48
End: 2018-12-17
Payer: COMMERCIAL

## 2018-12-17 VITALS
WEIGHT: 190 LBS | SYSTOLIC BLOOD PRESSURE: 140 MMHG | OXYGEN SATURATION: 97 % | HEART RATE: 86 BPM | DIASTOLIC BLOOD PRESSURE: 80 MMHG | BODY MASS INDEX: 29.82 KG/M2 | HEIGHT: 67 IN

## 2018-12-17 DIAGNOSIS — C67.2 MALIGNANT NEOPLASM OF LATERAL WALL OF URINARY BLADDER (H): Primary | ICD-10-CM

## 2018-12-17 DIAGNOSIS — Z79.2 PROPHYLACTIC ANTIBIOTIC: ICD-10-CM

## 2018-12-17 LAB
ALBUMIN UR-MCNC: NEGATIVE MG/DL
APPEARANCE UR: CLEAR
BILIRUB UR QL STRIP: NEGATIVE
COLOR UR AUTO: YELLOW
GLUCOSE UR STRIP-MCNC: NEGATIVE MG/DL
HGB UR QL STRIP: NEGATIVE
KETONES UR STRIP-MCNC: ABNORMAL MG/DL
LEUKOCYTE ESTERASE UR QL STRIP: NEGATIVE
NITRATE UR QL: NEGATIVE
PH UR STRIP: 6 PH (ref 5–7)
SOURCE: ABNORMAL
SP GR UR STRIP: 1.02 (ref 1–1.03)
UROBILINOGEN UR STRIP-ACNC: 0.2 EU/DL (ref 0.2–1)

## 2018-12-17 PROCEDURE — 81003 URINALYSIS AUTO W/O SCOPE: CPT | Performed by: UROLOGY

## 2018-12-17 PROCEDURE — 88305 TISSUE EXAM BY PATHOLOGIST: CPT | Performed by: UROLOGY

## 2018-12-17 PROCEDURE — 52224 CYSTOSCOPY AND TREATMENT: CPT | Performed by: UROLOGY

## 2018-12-17 RX ORDER — CIPROFLOXACIN 500 MG/1
500 TABLET, FILM COATED ORAL ONCE
Qty: 1 TABLET | Refills: 0 | Status: SHIPPED | OUTPATIENT
Start: 2018-12-17 | End: 2019-02-20

## 2018-12-17 RX ORDER — CIPROFLOXACIN 500 MG/1
500 TABLET, FILM COATED ORAL 2 TIMES DAILY
Qty: 1 TABLET | Refills: 0 | Status: SHIPPED | OUTPATIENT
Start: 2018-12-17 | End: 2019-02-20

## 2018-12-17 ASSESSMENT — MIFFLIN-ST. JEOR: SCORE: 1700.46

## 2018-12-17 ASSESSMENT — PAIN SCALES - GENERAL: PAINLEVEL: NO PAIN (0)

## 2018-12-17 NOTE — PROGRESS NOTES
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  A small recurrence was noted 6 months later also low-grade, and then another recurrence 2 years after that.  All have been low-grade and noninvasive.  Recently we did a cystoscopy in the office and it appeared there were 2 very tiny lesion seen in the bladder.     Procedure.  Cystoscopy with biopsy and fulguration of bladder tumor   Surgeon.    Daisy  Anesthesia.  Local anesthesia.  Description.  With the patient in the supine position, with the genital area prepped and draped in the customary fashion, with local anesthetic and the urethra, the flexible cystoscope was Inserted.  The penile urethra is normal.  The external sphincter is intact.  The prostatic urethra is unremarkable.  The interior of the bladder was carefully inspected.  A lesion can be seen lateral to the left ureteric orifice which I was able to biopsy although the biopsy itself is very small and I then subsequently fulgurated this with a very tiny electrode through the flexible scope.  I then looked for the second lesion which had previously been identified on the posterior wall of the bladder but after extensive searching was unable to located muscle stream most of the slough of the posterior wall of the bladder.  This was then withdrawn.  No other remarkable features have been noted.    Conclusion.  The patient will go home today with 1 Cipro pill to take.  I like to see him in the office in 6 months for check cystoscopy once again with fish test at that time.  I did carefully explain the entire situation to the patient in detail today.  I answered all the questions.    Plan.  6 months for cystoscopy with fish test.    Time.  10 minutes was spent  "in addition to the procedure in order to discuss the findings to go over the plan for follow-up and to discuss possible alternative therapeutic strategies that might be considered in the future.    \"This dictation was performed with voice recognition software and may contain errors,  omissions and inadvertent word substitution.\"    "

## 2018-12-17 NOTE — LETTER
12/17/2018       RE: Adarsh Lozano  [address]  [facility] [address]     Dear Colleague,    Thank you for referring your patient, Adarsh Lozano, to the [facility] [place] at [facility]. Please see a copy of my visit note below.    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  A small recurrence was noted 6 months later also low-grade, and then another recurrence 2 years after that.  All have been low-grade and noninvasive.  Recently we did a cystoscopy in the office and it appeared there were 2 very tiny lesion seen in the bladder.     Procedure.  Cystoscopy with biopsy and fulguration of bladder tumor   Surgeon.    Daisy  Anesthesia.  Local anesthesia.  Description.  With the patient in the supine position, with the genital area prepped and draped in the customary fashion, with local anesthetic and the urethra, the flexible cystoscope was Inserted.  The penile urethra is normal.  The external sphincter is intact.  The prostatic urethra is unremarkable.  The interior of the bladder was carefully inspected.  A lesion can be seen lateral to the left ureteric orifice which I was able to biopsy although the biopsy itself is very small and I then subsequently fulgurated this with a very tiny electrode through the flexible scope.  I then looked for the second lesion which had previously been identified on the posterior wall of the bladder but after extensive searching was unable to located muscle stream most of the slough of the posterior wall of the bladder.  This was then withdrawn.  No other remarkable features have been noted.    Conclusion.  The patient will go home today with 1  "Cipro pill to take.  I like to see him in the office in 6 months for check cystoscopy once again with fish test at that time.  I did carefully explain the entire situation to the patient in detail today.  I answered all the questions.    Plan.  6 months for cystoscopy with fish test.    Time.  10 minutes was spent in addition to the procedure in order to discuss the findings to go over the plan for follow-up and to discuss possible alternative therapeutic strategies that might be considered in the future.    \"This dictation was performed with voice recognition software and may contain errors,  omissions and inadvertent word substitution.\"    Again, thank you for allowing me to participate in the care of your patient.      Sincerely,    Aiden Villa MD      "

## 2018-12-18 NOTE — NURSING NOTE
Chief Complaint   Patient presents with     Cystoscopy     Pt is here for cystoscopy with biopsy and fulguration of bladder tumor.      UA RESULTS:  Recent Labs   Lab Test 12/17/18  1630  03/08/14  0827   COLOR Yellow   < > Yellow   APPEARANCE Clear   < > Clear   URINEGLC Negative   < > Negative   URINEBILI Negative   < > Negative   URINEKETONE Trace*   < > Negative   SG 1.025   < > 1.025   UBLD Negative   < > Large*   URINEPH 6.0   < > 6.0   PROTEIN Negative   < > Negative   UROBILINOGEN 0.2   < > 0.2   NITRITE Negative   < > Negative   LEUKEST Negative   < > Negative   RBCU  --   --  10-25*   WBCU  --   --  O - 2    < > = values in this interval not displayed.     Anneliese Mcadams, CMA

## 2018-12-19 LAB — COPATH REPORT: NORMAL

## 2019-02-20 ENCOUNTER — OFFICE VISIT (OUTPATIENT)
Dept: FAMILY MEDICINE | Facility: CLINIC | Age: 49
End: 2019-02-20
Payer: COMMERCIAL

## 2019-02-20 VITALS
RESPIRATION RATE: 16 BRPM | DIASTOLIC BLOOD PRESSURE: 87 MMHG | BODY MASS INDEX: 30.21 KG/M2 | SYSTOLIC BLOOD PRESSURE: 136 MMHG | HEIGHT: 67 IN | TEMPERATURE: 100.6 F | OXYGEN SATURATION: 97 % | WEIGHT: 192.5 LBS | HEART RATE: 127 BPM

## 2019-02-20 DIAGNOSIS — C67.2 MALIGNANT NEOPLASM OF LATERAL WALL OF URINARY BLADDER (H): ICD-10-CM

## 2019-02-20 DIAGNOSIS — J00: Primary | ICD-10-CM

## 2019-02-20 DIAGNOSIS — J32.0 CHRONIC MAXILLARY SINUSITIS: ICD-10-CM

## 2019-02-20 LAB
ALBUMIN UR-MCNC: NEGATIVE MG/DL
APPEARANCE UR: CLEAR
BASOPHILS # BLD AUTO: 0 10E9/L (ref 0–0.2)
BASOPHILS NFR BLD AUTO: 0.2 %
BILIRUB UR QL STRIP: NEGATIVE
COLOR UR AUTO: YELLOW
DEPRECATED S PYO AG THROAT QL EIA: NORMAL
DIFFERENTIAL METHOD BLD: ABNORMAL
EOSINOPHIL # BLD AUTO: 0.1 10E9/L (ref 0–0.7)
EOSINOPHIL NFR BLD AUTO: 1.4 %
ERYTHROCYTE [DISTWIDTH] IN BLOOD BY AUTOMATED COUNT: 12.6 % (ref 10–15)
FLUAV+FLUBV AG SPEC QL: NEGATIVE
FLUAV+FLUBV AG SPEC QL: POSITIVE
GLUCOSE UR STRIP-MCNC: NEGATIVE MG/DL
HCT VFR BLD AUTO: 46.3 % (ref 40–53)
HGB BLD-MCNC: 15.6 G/DL (ref 13.3–17.7)
HGB UR QL STRIP: ABNORMAL
KETONES UR STRIP-MCNC: NEGATIVE MG/DL
LEUKOCYTE ESTERASE UR QL STRIP: NEGATIVE
LYMPHOCYTES # BLD AUTO: 0.7 10E9/L (ref 0.8–5.3)
LYMPHOCYTES NFR BLD AUTO: 16.9 %
MCH RBC QN AUTO: 30.6 PG (ref 26.5–33)
MCHC RBC AUTO-ENTMCNC: 33.7 G/DL (ref 31.5–36.5)
MCV RBC AUTO: 91 FL (ref 78–100)
MONOCYTES # BLD AUTO: 0.7 10E9/L (ref 0–1.3)
MONOCYTES NFR BLD AUTO: 17.4 %
MUCOUS THREADS #/AREA URNS LPF: PRESENT /LPF
NEUTROPHILS # BLD AUTO: 2.7 10E9/L (ref 1.6–8.3)
NEUTROPHILS NFR BLD AUTO: 64.1 %
NITRATE UR QL: NEGATIVE
PH UR STRIP: 6 PH (ref 5–7)
PLATELET # BLD AUTO: 211 10E9/L (ref 150–450)
RBC # BLD AUTO: 5.09 10E12/L (ref 4.4–5.9)
RBC #/AREA URNS AUTO: ABNORMAL /HPF
SOURCE: ABNORMAL
SP GR UR STRIP: 1.01 (ref 1–1.03)
SPECIMEN SOURCE: ABNORMAL
SPECIMEN SOURCE: NORMAL
UROBILINOGEN UR STRIP-ACNC: 0.2 EU/DL (ref 0.2–1)
WBC # BLD AUTO: 4.2 10E9/L (ref 4–11)
WBC #/AREA URNS AUTO: ABNORMAL /HPF

## 2019-02-20 PROCEDURE — 81001 URINALYSIS AUTO W/SCOPE: CPT | Performed by: FAMILY MEDICINE

## 2019-02-20 PROCEDURE — 87804 INFLUENZA ASSAY W/OPTIC: CPT | Performed by: FAMILY MEDICINE

## 2019-02-20 PROCEDURE — 36415 COLL VENOUS BLD VENIPUNCTURE: CPT | Performed by: FAMILY MEDICINE

## 2019-02-20 PROCEDURE — 87880 STREP A ASSAY W/OPTIC: CPT | Performed by: FAMILY MEDICINE

## 2019-02-20 PROCEDURE — 85025 COMPLETE CBC W/AUTO DIFF WBC: CPT | Performed by: FAMILY MEDICINE

## 2019-02-20 PROCEDURE — 99213 OFFICE O/P EST LOW 20 MIN: CPT | Performed by: FAMILY MEDICINE

## 2019-02-20 PROCEDURE — 87081 CULTURE SCREEN ONLY: CPT | Performed by: FAMILY MEDICINE

## 2019-02-20 RX ORDER — AMOXICILLIN 500 MG/1
500 CAPSULE ORAL 3 TIMES DAILY
Qty: 30 CAPSULE | Refills: 0 | Status: SHIPPED | OUTPATIENT
Start: 2019-02-20 | End: 2019-03-02

## 2019-02-20 ASSESSMENT — MIFFLIN-ST. JEOR: SCORE: 1715.76

## 2019-02-20 NOTE — LETTER
My Asthma Action Plan  Name: Adarsh Lozano   YOB: 1970  Date: 2/20/2019   My doctor: Dayron Grewal MD   My clinic: [facility]        My Control Medicine: None  My Rescue Medicine: none   My Asthma Severity: controlled  Avoid your asthma triggers: pollens               GREEN ZONE   Good Control    I feel good    No cough or wheeze    Can work, sleep and play without asthma symptoms       Take your asthma control medicine every day.     1. If exercise triggers your asthma, take your rescue medication    15 minutes before exercise or sports, and    During exercise if you have asthma symptoms  2. Spacer to use with inhaler: If you have a spacer, make sure to use it with your inhaler             YELLOW ZONE Getting Worse  I have ANY of these:    I do not feel good    Cough or wheeze    Chest feels tight    Wake up at night   1. Keep taking your Green Zone medications  2. Start taking your rescue medicine:    every 20 minutes for up to 1 hour. Then every 4 hours for 24-48 hours.  3. If you stay in the Yellow Zone for more than 12-24 hours, contact your doctor.  4. If you do not return to the Green Zone in 12-24 hours or you get worse, start taking your oral steroid medicine if prescribed by your provider.           RED ZONE Medical Alert - Get Help  I have ANY of these:    I feel awful    Medicine is not helping    Breathing getting harder    Trouble walking or talking    Nose opens wide to breathe       1. Take your rescue medicine NOW  2. If your provider has prescribed an oral steroid medicine, start taking it NOW  3. Call your doctor NOW  4. If you are still in the Red Zone after 20 minutes and you have not reached your doctor:    Take your rescue medicine again and    Call 911 or go to the emergency room right away    See your regular doctor within 2 weeks of an Emergency Room or Urgent Care visit for follow-up treatment.          Annual Reminders:  Meet with Asthma  Educator,  Flu Shot in the Fall, consider Pneumonia Vaccination for patients with asthma (aged 19 and older).    Pharmacy:    [id number] - [place], [place] - [address] AT [facility] [address] [facility]  [id number] - [place], [place] - [address]  [facility] - USE FOR FAXING ONLY - ELIANE ALLEN  [facility] - [place], [place] - [address] [place] [address]                      Asthma Triggers  How To Control Things That Make Your Asthma Worse    Triggers are things that make your asthma worse.  Look at the list below to help you find your triggers and what you can do about them.  You can help prevent asthma flare-ups by staying away from your triggers.      Trigger                                                          What you can do   Cigarette Smoke  Tobacco smoke can make asthma worse. Do not allow smoking in your home, car or around you.  Be sure no one smokes at a child s day care or school.  If you smoke, ask your health care provider for ways to help you quit.  Ask family members to quit too.  Ask your health care provider for a referral to Quit Plan to help you quit smoking, or call 2-812-775-PLAN.     Colds, Flu, Bronchitis  These are common triggers of asthma. Wash your hands often.  Don t touch your eyes, nose or mouth.  Get a flu shot every year.     Dust Mites  These are tiny bugs that live in cloth or carpet. They are too small to see. Wash sheets and blankets in hot water every week.   Encase pillows and mattress in dust mite proof covers.  Avoid having carpet if you can. If you have carpet, vacuum weekly.   Use a dust mask and HEPA vacuum.   Pollen and Outdoor Mold  Some people are allergic to trees, grass, or weed pollen, or molds. Try to keep your windows closed.  Limit time out doors when pollen count is high.   Ask you health care provider about taking medicine during allergy season.     Animal  Dander  Some people are allergic to skin flakes, urine or saliva from pets with fur or feathers. Keep pets with fur or feathers out of your home.    If you can t keep the pet outdoors, then keep the pet out of your bedroom.  Keep the bedroom door closed.  Keep pets off cloth furniture and away from stuffed toys.     Mice, Rats, and Cockroaches  Some people are allergic to the waste from these pests.   Cover food and garbage.  Clean up spills and food crumbs.  Store grease in the refrigerator.   Keep food out of the bedroom.   Indoor Mold  This can be a trigger if your home has high moisture. Fix leaking faucets, pipes, or other sources of water.   Clean moldy surfaces.  Dehumidify basement if it is damp and smelly.   Smoke, Strong Odors, and Sprays  These can reduce air quality. Stay away from strong odors and sprays, such as perfume, powder, hair spray, paints, smoke incense, paint, cleaning products, candles and new carpet.   Exercise or Sports  Some people with asthma have this trigger. Be active!  Ask your doctor about taking medicine before sports or exercise to prevent symptoms.    Warm up for 5-10 minutes before and after sports or exercise.     Other Triggers of Asthma  Cold air:  Cover your nose and mouth with a scarf.  Sometimes laughing or crying can be a trigger.  Some medicines and food can trigger asthma.

## 2019-02-20 NOTE — LETTER
[facility]  [address] [place], [address]  [phone]        February 20, 2019    Adarsh Lozano                                                                                                                                                       [address] [facility] [id number]      Seen here today for influenza. Off work until 2/25/2019.          Dayron Grewal MD

## 2019-02-20 NOTE — PROGRESS NOTES
SUBJECTIVE:   Adarsh Lozano is a 48 year old male who presents to clinic today for the following health issues:      RESPIRATORY SYMPTOMS      Duration: four days    Description  nasal congestion, facial pain/pressure, fever, ear pain both, headache and fatigue/malaise    Severity: moderate    Accompanying signs and symptoms: teeth hurting     History (predisposing factors):  asthma    Precipitating or alleviating factors: None    Therapies tried and outcome:  none    Fever and chills, he takes meds for bladder tumor      Problem list and histories reviewed & adjusted, as indicated.  Additional history: as documented    BP Readings from Last 3 Encounters:   02/20/19 136/87   12/17/18 140/80   11/19/18 140/90    Wt Readings from Last 3 Encounters:   02/20/19 87.3 kg (192 lb 8 oz)   12/17/18 86.2 kg (190 lb)   11/19/18 83.9 kg (185 lb)                Fever:  Yes: 103  Chills:  Yes  Sweats:  Yes  Headache:  Yes  Sinus Symptoms:  maxillary pain/pressure bilateral  Conjunctivitis:  NO  Ear Pain:  NO  Congestion:  Yes  Sore Throat:  Yes  Strep/Sick Exposure:  Yes  Cough:  barky   Wheeze:  No    Other Symptoms:     Smoker:  No  Decreased Appetite: Yes  Nausea and Vomiting:  No  Diarrhea:  No  Dysuria/Frequency:  No  Fussy/Achy:  Yes    Influenza test is positive: he did have a flu shot    Patient Active Problem List   Diagnosis     CARDIOVASCULAR SCREENING; LDL GOAL LESS THAN 160     Intermittent asthma     Hypertension     Bladder cancer (H)       Current Outpatient Medications   Medication Sig Dispense Refill     amoxicillin (AMOXIL) 500 MG capsule Take 1 capsule (500 mg) by mouth 3 times daily for 10 days 30 capsule 0     lisinopril (PRINIVIL/ZESTRIL) 10 MG tablet Take 1 tablet (10 mg) by mouth daily 90 tablet 3       Allergies   Allergen Reactions     No Known Drug Allergies      Seasonal Allergies        OBJECTIVE:    GENERAL: no apparent distress  EYES: Conjunctiva are not injected   without discharge.  EARS: Left  TM is normal: no effusions, no erythema, and normal landmarks.  Right TM is normal: no effusions, no erythema, and normal landmarks.  NOSE: Nasal mucosa is normal.  THROAT: normal.  NECK: supple with no adenopathy:no fever  CARDIAC:NORMAL - regular rate and rhythm without murmur. and without extra heart sounds  RESP: Normal - CTA without rales, rhonchi, or wheezing.  SKIN: warm      ASSESSMENT:    1. Acute catarrhal fever    2. Chronic maxillary sinusitis    Acute influenza day 4:     Treat the sinusitis on speculation

## 2019-02-21 LAB
BACTERIA SPEC CULT: NORMAL
SPECIMEN SOURCE: NORMAL

## 2019-02-21 ASSESSMENT — ASTHMA QUESTIONNAIRES: ACT_TOTALSCORE: 23

## 2019-06-27 DIAGNOSIS — C67.9 BLADDER CANCER (H): Primary | ICD-10-CM

## 2019-07-01 ENCOUNTER — OFFICE VISIT (OUTPATIENT)
Dept: UROLOGY | Facility: CLINIC | Age: 49
End: 2019-07-01
Payer: COMMERCIAL

## 2019-07-01 VITALS
DIASTOLIC BLOOD PRESSURE: 82 MMHG | BODY MASS INDEX: 29.03 KG/M2 | OXYGEN SATURATION: 98 % | HEIGHT: 67 IN | HEART RATE: 71 BPM | WEIGHT: 185 LBS | SYSTOLIC BLOOD PRESSURE: 136 MMHG

## 2019-07-01 DIAGNOSIS — Z79.2 PROPHYLACTIC ANTIBIOTIC: Primary | ICD-10-CM

## 2019-07-01 DIAGNOSIS — C67.9 MALIGNANT NEOPLASM OF URINARY BLADDER, UNSPECIFIED SITE (H): ICD-10-CM

## 2019-07-01 PROCEDURE — 99212 OFFICE O/P EST SF 10 MIN: CPT | Mod: 25 | Performed by: UROLOGY

## 2019-07-01 PROCEDURE — 81003 URINALYSIS AUTO W/O SCOPE: CPT | Performed by: UROLOGY

## 2019-07-01 PROCEDURE — 00000103 ZZHCL STATISTIC CYTO-FISH QC 88120: Performed by: UROLOGY

## 2019-07-01 PROCEDURE — 52000 CYSTOURETHROSCOPY: CPT | Performed by: UROLOGY

## 2019-07-01 RX ORDER — CIPROFLOXACIN 500 MG/1
500 TABLET, FILM COATED ORAL ONCE
Qty: 1 TABLET | Refills: 0 | Status: SHIPPED | OUTPATIENT
Start: 2019-07-01 | End: 2019-07-01

## 2019-07-01 ASSESSMENT — MIFFLIN-ST. JEOR: SCORE: 1677.78

## 2019-07-01 ASSESSMENT — PAIN SCALES - GENERAL: PAINLEVEL: NO PAIN (0)

## 2019-07-01 NOTE — PATIENT INSTRUCTIONS
"AFTER YOUR CYSTOSCOPY  ?  ?  You have just completed a cystoscopy, or \"cysto\", which allowed your physician to learn more about your bladder (or to remove a stent placed after surgery). We suggest that you continue to avoid caffeine, fruit juice, and alcohol for the next 24 hours, however, you are encouraged to return to your normal activities.  ?  ?  A few things that are considered normal after your cystoscopy:  ?  * small amount of bleeding (or spotting) that clears within the next 24 hours  ?  * slight burning sensation with urination  ?  * sensation of needing to void (urinate) more frequently  ?  * the feeling of \"air\" in your urine  ?  * mild discomfort that is relieved with Tylenol    * bladder spasms  ?  ?  ?  Please contact our office promptly if you:  ?  * develop a fever above 101 degrees  ?  * are unable to urinate  ?  * develop bright red blood that does not stop  ?  * experience severe pain or swelling  ?  ?  ?  And of course, please contact our office with any concerns or questions [phone]  ?    AFTER YOUR CYSTOSCOPY        You have just completed a cystoscopy, or \"cysto\", which allowed your physician to learn more about your bladder (or to remove a stent placed after surgery). We suggest that you continue to avoid caffeine, fruit juice, and alcohol for the next 24 hours, however, you are encouraged to return to your normal activities.         A few things that are considered normal after your cystoscopy:     * Small amount of bleeding (or spotting) that clears within the next 24 hours     * Slight burning sensation with urination     * Sensation to of needing to avoid more frequently     * The feeling of \"air\" in your urine     * Mild discomfort that is relieved with Tylenol        Please contact our office promptly if you:     * Develop a fever above 101 degrees     * Are unable to urinate     * Develop bright red blood that does not stop     * Severe pain or swelling         Please contact " our office with any concerns or questions @[facility].

## 2019-07-01 NOTE — PROGRESS NOTES
"This very pleasant 48-year-old gentleman returns today for check cystoscopy.  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  This was carefully resected.  2 small recurrences were noted 6 months later also low-grade and another further 2 years after that 6 months ago we found to recurrent very tiny lesions in the bladder which were biopsied and fulgurated in the office.  He returns 6 months later for check cystoscopy.     Procedure.  Cystoscopy.   Surgeon.    Daisy  Anesthesia.  Local anesthesia.  Description.  With the patient in the supine position, with the genital area prepped and draped in the customary fashion, with local anesthetic and the urethra, the flexible cystoscope was Inserted.  The penile urethra is normal.  The external sphincter is intact.  When viewed from verumontanum there is no significant enlargement of lateral lobes of prostate.  The bladder is entered without difficulty.  There is minimal bladder trabeculation.  There is no evidence of recurrent neoplasm in the bladder.  There is no evidence of stone formation.  There are no other remarkable features    Impression.  The urothelium is stable.  We will repeat cystoscopy in 6 months and if that remains clear we will then go to once a year.  I did have a discussion with him today about the reasons for ongoing follow-up, the possibility of further recurrence as we have already seen in the past and the possibility of alternative therapeutic strategies should that be problems such as this in the future.  As I outlined him in detail today.    Plan.  Cystoscopy and fish test in 6 months.    Time.  10 minutes in addition to cystoscopy to outline issues as outlined above in addition to discussion about other mild urologic symptoms associated with this    \"This dictation was performed with voice recognition software and may contain errors,  omissions and " "inadvertent word substitution.\"    "

## 2019-07-01 NOTE — LETTER
7/1/2019       RE: Adarsh Lozano  [address]  [facility] [address]     Dear Colleague,    Thank you for referring your patient, Adarsh Lozano, to the [facility] [place] at [facility]. Please see a copy of my visit note below.    This very pleasant 48-year-old gentleman returns today for check cystoscopy.  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  This was carefully resected.  2 small recurrences were noted 6 months later also low-grade and another further 2 years after that 6 months ago we found to recurrent very tiny lesions in the bladder which were biopsied and fulgurated in the office.  He returns 6 months later for check cystoscopy.     Procedure.  Cystoscopy.   Surgeon.    Daisy  Anesthesia.  Local anesthesia.  Description.  With the patient in the supine position, with the genital area prepped and draped in the customary fashion, with local anesthetic and the urethra, the flexible cystoscope was Inserted.  The penile urethra is normal.  The external sphincter is intact.  When viewed from verumontanum there is no significant enlargement of lateral lobes of prostate.  The bladder is entered without difficulty.  There is minimal bladder trabeculation.  There is no evidence of recurrent neoplasm in the bladder.  There is no evidence of stone formation.  There are no other remarkable features    Impression.  The urothelium is stable.  We will repeat cystoscopy in 6 months and if that remains clear we will then go to once a year.  I did have a discussion with him today about the reasons for ongoing follow-up, the possibility of further recurrence as we have already seen in the past and the possibility of alternative therapeutic strategies should that be problems such as this in the future.  As I outlined him in detail today.    Plan.  " Cystoscopy and fish test in 6 months.    Time.  10 minutes in addition to cystoscopy to outline issues as outlined above in addition to discussion about other mild urologic symptoms associated with this    \"This dictation was performed with voice recognition software and may contain errors,  omissions and inadvertent word substitution.\"      Again, thank you for allowing me to participate in the care of your patient.      Sincerely,    Aiden Villa MD      "

## 2019-07-01 NOTE — NURSING NOTE
Chief Complaint   Patient presents with     Cystoscopy     Pt here for cysto due to bladder cancer     Prior to the start of the procedure and with procedural staff participation, I verbally confirmed the patient s identity using two indicators, relevant allergies, that the procedure was appropriate and matched the consent or emergent situation, and that the correct equipment/implants were available. Immediately prior to starting the procedure I conducted the Time Out with the procedural staff and re-confirmed the patient s name, procedure, and site/side. I have wiped the patient off with the povidone-Iodine solution, draped them,  used Lidocaine hydrochloride jelly, and instilled sterile water into the bladder. (The Joint Commission universal protocol was followed.)  Yes    Sedation (Moderate or Deep): None      Meghana Clay

## 2019-07-08 ENCOUNTER — OFFICE VISIT (OUTPATIENT)
Dept: FAMILY MEDICINE | Facility: CLINIC | Age: 49
End: 2019-07-08
Payer: COMMERCIAL

## 2019-07-08 VITALS
WEIGHT: 192 LBS | SYSTOLIC BLOOD PRESSURE: 136 MMHG | OXYGEN SATURATION: 97 % | DIASTOLIC BLOOD PRESSURE: 90 MMHG | HEART RATE: 93 BPM | BODY MASS INDEX: 30.07 KG/M2 | TEMPERATURE: 98.4 F

## 2019-07-08 DIAGNOSIS — R23.8 VESICULAR RASH: Primary | ICD-10-CM

## 2019-07-08 LAB — COPATH REPORT: NORMAL

## 2019-07-08 PROCEDURE — 99213 OFFICE O/P EST LOW 20 MIN: CPT | Performed by: PHYSICIAN ASSISTANT

## 2019-07-08 RX ORDER — MUPIROCIN 20 MG/G
OINTMENT TOPICAL 3 TIMES DAILY
Qty: 30 G | Refills: 0 | Status: SHIPPED | OUTPATIENT
Start: 2019-07-08 | End: 2019-07-22

## 2019-07-08 RX ORDER — PREDNISONE 20 MG/1
40 TABLET ORAL 2 TIMES DAILY
Qty: 20 TABLET | Refills: 0 | Status: SHIPPED | OUTPATIENT
Start: 2019-07-08 | End: 2019-07-13

## 2019-07-08 NOTE — PATIENT INSTRUCTIONS
Use the antibiotic ointment on the rash until rash clears to help prevent infection.    Take the complete course of the antibiotic.    Follow-up with dermatology if not improving as expected.

## 2019-07-08 NOTE — PROGRESS NOTES
Subjective     Adarsh Lozano is a 48 year old male who presents to clinic today for the following health issues:    HPI   Rash  Onset: 5 days     Description:   Location: waistline, back, and left thigh   Character: red, bump with blisters   Itching (Pruritis): YES and some areas are painful    Progression of Symptoms:  worsening    Accompanying Signs & Symptoms:  Fever: no   Body aches or joint pain: no   Sore throat symptoms: no   Recent cold symptoms: no     History:   Previous similar rash: no     Precipitating factors:   Exposure to similar rash: no   New exposures: None   Recent travel: no     Alleviating factors:      Therapies Tried and outcome: aloe without improvement      Patient Active Problem List   Diagnosis     CARDIOVASCULAR SCREENING; LDL GOAL LESS THAN 160     Intermittent asthma     Hypertension     Bladder cancer (H)     Past Surgical History:   Procedure Laterality Date     BLADDER SURGERY       CYSTOSCOPY       CYSTOSCOPY, RETROGRADES, COMBINED  3/24/2014    Procedure: COMBINED CYSTOSCOPY, RETROGRADES;;  Surgeon: Aiden Villa MD;  Location:  OR     CYSTOSCOPY, TRANSURETHRAL RESECTION (TUR) TUMOR BLADDER, COMBINED  3/24/2014    Procedure: COMBINED CYSTOSCOPY, TRANSURETHRAL RESECTION (TUR) TUMOR BLADDER;  CYSTOSCOPY, BILATERAL RETROGRADES, TRANSURETHRAL RESECTION BLADDER TUMOR ;  Surgeon: Aiden Villa MD;  Location:  OR       Social History     Tobacco Use     Smoking status: Never Smoker     Smokeless tobacco: Never Used   Substance Use Topics     Alcohol use: Yes     Comment: occasionally     Family History   Problem Relation Age of Onset     Cardiovascular Father      Heart Disease Father         MI         Current Outpatient Medications   Medication Sig Dispense Refill     mupirocin (BACTROBAN) 2 % external ointment Apply topically 3 times daily for 14 days 30 g 0     predniSONE (DELTASONE) 20 MG tablet Take 2 tablets (40 mg) by mouth 2 times daily for 5 days 20  tablet 0     lisinopril (PRINIVIL/ZESTRIL) 10 MG tablet Take 1 tablet (10 mg) by mouth daily 90 tablet 3     Allergies   Allergen Reactions     No Known Drug Allergies      Seasonal Allergies        Reviewed and updated as needed this visit by Provider         Review of Systems   ROS COMP: Constitutional, HEENT, cardiovascular, pulmonary, gi and gu systems are negative, except as otherwise noted.      Objective    /90 (BP Location: Right arm, Patient Position: Left side, Cuff Size: Adult Regular)   Pulse 93   Temp 98.4  F (36.9  C)   Wt 87.1 kg (192 lb)   SpO2 97%   BMI 30.07 kg/m    Body mass index is 30.07 kg/m .       Physical Exam   GENERAL: healthy, alert and no distress  EYES: Eyes grossly normal to inspection, PERRL and conjunctivae and sclerae normal  MS: no gross musculoskeletal defects noted, no edema  SKIN: Vesicular rash present in middle of low back. There is a cluster that is dark, almost black in color and have a caved in appearance. There is a small similar cluster on his left inner thigh as well. There are also mildly erythematous areas which smaller blisters present.  NEURO: Normal strength and tone, mentation intact and speech normal  PSYCH: mentation appears normal, affect normal/bright    Diagnostic Test Results:  none         Assessment & Plan     (R23.8) Vesicular rash  (primary encounter diagnosis)    Comment: Unknown cause or diagnosis. Try steroid to help with itching and hopefully rash. Use antibiotic ointment to help prevent infection. Refer to dermatology so they can help diagnose if not improving.    Plan: predniSONE (DELTASONE) 20 MG tablet, mupirocin         (BACTROBAN) 2 % external ointment, DERMATOLOGY         REFERRAL                Patient Instructions   Use the antibiotic ointment on the rash until rash clears to help prevent infection.    Take the complete course of the antibiotic.    Follow-up with dermatology if not improving as expected.      No follow-ups on  file.    Michael Bergeron PA-C  [facility]

## 2019-07-22 DIAGNOSIS — I10 BENIGN HYPERTENSION: ICD-10-CM

## 2019-07-23 RX ORDER — LISINOPRIL 10 MG/1
TABLET ORAL
Qty: 90 TABLET | Refills: 3 | Status: SHIPPED | OUTPATIENT
Start: 2019-07-23 | End: 2020-08-19

## 2019-07-23 NOTE — TELEPHONE ENCOUNTER
"Requested Prescriptions   Pending Prescriptions Disp Refills     lisinopril (PRINIVIL/ZESTRIL) 10 MG tablet [Pharmacy Med Name: LISINOPRIL TABS 10MG] 90 tablet 3     Sig: TAKE 1 TABLET DAILY       ACE Inhibitors (Including Combos) Protocol Failed - 7/22/2019  8:31 PM        Failed - Blood pressure under 140/90 in past 12 months     BP Readings from Last 3 Encounters:   07/08/19 136/90   07/01/19 136/82   02/20/19 136/87                 Failed - Normal serum creatinine on file in past 12 months     Recent Labs   Lab Test 12/11/17  1410   CR 0.88             Failed - Normal serum potassium on file in past 12 months     Recent Labs   Lab Test 12/11/17  1410   POTASSIUM 3.8             Passed - Recent (12 mo) or future (30 days) visit within the authorizing provider's specialty     Patient had office visit in the last 12 months or has a visit in the next 30 days with authorizing provider or within the authorizing provider's specialty.  See \"Patient Info\" tab in inbasket, or \"Choose Columns\" in Meds & Orders section of the refill encounter.              Passed - Medication is active on med list        Passed - Patient is age 18 or older          "

## 2019-07-23 NOTE — TELEPHONE ENCOUNTER
Routing refill request to provider for review/approval because:  Failed protocol- see below    Elisha OrourkeRN BSN  [facility]  [phone]

## 2019-11-06 ENCOUNTER — HEALTH MAINTENANCE LETTER (OUTPATIENT)
Age: 49
End: 2019-11-06

## 2019-12-02 ENCOUNTER — TELEPHONE (OUTPATIENT)
Dept: FAMILY MEDICINE | Facility: CLINIC | Age: 49
End: 2019-12-02

## 2019-12-02 NOTE — LETTER
[facility]  [id number] [place] [id number] [phone]  December 9, 2019    Adarsh Lozano  [address] [facility] [address]    Dear Adarsh,    I care about your health and have reviewed your health plan. I have reviewed your medical conditions, medication list, and lab results and am making recommendations based on this review, to better manage your health.    You are in particular need of attention regarding:  -High Blood Pressure  -Wellness (Physical) Visit     I am recommending that you:  {recommendations:-schedule a NURSE-ONLY BLOOD PRESSURE APPOINTMENT within the next 1-4 weeks.  -schedule a WELLNESS (Physical) APPOINTMENT with me.   I will check fasting labs the same day - nothing to eat except water and meds for 8-10 hours prior.    Here is a list of Health Maintenance topics that are due now or due soon:  Health Maintenance Due   Topic Date Due     PREVENTIVE CARE VISIT  1972     ANNUAL REVIEW OF HM ORDERS  1972     HIV SCREENING  11/05/1987     PNEUMOCOCCAL IMMUNIZATION 19-64 HIGHEST RISK (1 of 3 - PCV13) 11/05/1991     LIPID  11/05/2007     ASTHMA CONTROL TEST  08/20/2019     INFLUENZA VACCINE (1) 09/01/2019       Please call us at [phone] (or use [employer]) to address the above recommendations.       Thank you for trusting [facility] and we appreciate the opportunity to serve you.  We look forward to supporting your healthcare needs in the future.    Healthy Regards,    Dayron Grewal MD/jana

## 2019-12-02 NOTE — TELEPHONE ENCOUNTER
Patient Quality Outreach      Patient has the following on his problem list:     Hypertension   Last three blood pressure readings:  BP Readings from Last 3 Encounters:   07/08/19 136/90   07/01/19 136/82   02/20/19 136/87     Blood pressure: MONITOR    HTN Guidelines:  ? 139/89     Composite cancer screening  Chart review shows that this patient is due/due soon for the following None  Patient declined cancer screening. No  Summary:    Patient is due/failing the following:   Hypertension - BP Check and Adult/Adolescent Physical  Offer flu shot  Type of outreach:    Phone, left message for patient to call back.     Questions for provider review:    None                                                                                                                                    Mackenzie Campbell       Chart routed to Care Team.

## 2020-01-07 DIAGNOSIS — C67.9 MALIGNANT NEOPLASM OF URINARY BLADDER, UNSPECIFIED SITE (H): Primary | ICD-10-CM

## 2020-01-13 ENCOUNTER — OFFICE VISIT (OUTPATIENT)
Dept: UROLOGY | Facility: CLINIC | Age: 50
End: 2020-01-13
Payer: COMMERCIAL

## 2020-01-13 VITALS
HEIGHT: 67 IN | WEIGHT: 190 LBS | HEART RATE: 84 BPM | DIASTOLIC BLOOD PRESSURE: 78 MMHG | SYSTOLIC BLOOD PRESSURE: 138 MMHG | BODY MASS INDEX: 29.82 KG/M2 | OXYGEN SATURATION: 97 %

## 2020-01-13 DIAGNOSIS — Z79.2 PROPHYLACTIC ANTIBIOTIC: Primary | ICD-10-CM

## 2020-01-13 DIAGNOSIS — C67.9 MALIGNANT NEOPLASM OF URINARY BLADDER, UNSPECIFIED SITE (H): ICD-10-CM

## 2020-01-13 PROCEDURE — 52000 CYSTOURETHROSCOPY: CPT | Performed by: UROLOGY

## 2020-01-13 PROCEDURE — 81003 URINALYSIS AUTO W/O SCOPE: CPT | Performed by: UROLOGY

## 2020-01-13 PROCEDURE — 00000103 ZZHCL STATISTIC CYTO-FISH QC 88120: Performed by: UROLOGY

## 2020-01-13 RX ORDER — CIPROFLOXACIN 500 MG/1
500 TABLET, FILM COATED ORAL ONCE
Qty: 1 TABLET | Refills: 0 | Status: SHIPPED | OUTPATIENT
Start: 2020-01-13 | End: 2020-01-13

## 2020-01-13 ASSESSMENT — MIFFLIN-ST. JEOR: SCORE: 1695.46

## 2020-01-13 ASSESSMENT — PAIN SCALES - GENERAL: PAINLEVEL: NO PAIN (0)

## 2020-01-13 NOTE — PATIENT INSTRUCTIONS
"AFTER YOUR CYSTOSCOPY  ?  ?  You have just completed a cystoscopy, or \"cysto\", which allowed your physician to learn more about your bladder (or to remove a stent placed after surgery). We suggest that you continue to avoid caffeine, fruit juice, and alcohol for the next 24 hours, however, you are encouraged to return to your normal activities.  ?  ?  A few things that are considered normal after your cystoscopy:  ?  * small amount of bleeding (or spotting) that clears within the next 24 hours  ?  * slight burning sensation with urination  ?  * sensation of needing to void (urinate) more frequently  ?  * the feeling of \"air\" in your urine  ?  * mild discomfort that is relieved with Tylenol    * bladder spasms  ?  ?  ?  Please contact our office promptly if you:  ?  * develop a fever above 101 degrees  ?  * are unable to urinate  ?  * develop bright red blood that does not stop  ?  * experience severe pain or swelling  ?  ?  ?  And of course, please contact our office with any concerns or questions [phone]  ?    AFTER YOUR CYSTOSCOPY        You have just completed a cystoscopy, or \"cysto\", which allowed your physician to learn more about your bladder (or to remove a stent placed after surgery). We suggest that you continue to avoid caffeine, fruit juice, and alcohol for the next 24 hours, however, you are encouraged to return to your normal activities.         A few things that are considered normal after your cystoscopy:     * Small amount of bleeding (or spotting) that clears within the next 24 hours     * Slight burning sensation with urination     * Sensation to of needing to avoid more frequently     * The feeling of \"air\" in your urine     * Mild discomfort that is relieved with Tylenol        Please contact our office promptly if you:     * Develop a fever above 101 degrees     * Are unable to urinate     * Develop bright red blood that does not stop     * Severe pain or swelling         Please contact " our office with any concerns or questions @[facility].

## 2020-01-13 NOTE — PROGRESS NOTES
"This very pleasant 49-year-old gentleman returns today for check cystoscopy.  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  This was carefully resected.  2 small recurrences were noted 6 months later also low-grade and another further 2 years after that 6 months ago we found to recurrent very tiny lesions in the bladder which were biopsied and fulgurated in the office.  He returns 6 months later for check cystoscopy.      Procedure.  Cystoscopy.   Surgeon.    Daisy  Anesthesia.  Local anesthesia.  Description.  With the patient in the supine position, with the genital area prepped and draped in the customary fashion, with local anesthetic and the urethra, the flexible cystoscope was Inserted.  Penile urethra is normal  External sphincter is intact  Prostatic urethra is normal  No evidence of recurrent tumor in the bladder  No other remarkable features    Impression  Goods progress  Discussed therapeutic alernatives in detail as well as current findings  Answered all questions    Plan  1 year for cystoscopy and cytology    \"This dictation was performed with voice recognition software and may contain errors,  omissions and inadvertent word substitution.\"    "

## 2020-01-13 NOTE — NURSING NOTE
Chief Complaint   Patient presents with     Cystoscopy     Pt here for cysto due to bladder cancer     Prior to the start of the procedure and with procedural staff participation, I verbally confirmed the patient s identity using two indicators, relevant allergies, that the procedure was appropriate and matched the consent or emergent situation, and that the correct equipment/implants were available. Immediately prior to starting the procedure I conducted the Time Out with the procedural staff and re-confirmed the patient s name, procedure, and site/side. I have wiped the patient off with the povidone-Iodine solution, draped them,  used Lidocaine hydrochloride jelly, and instilled sterile water into the bladder. (The Joint Commission universal protocol was followed.)  Yes    Sedation (Moderate or Deep): None      Meghana Clay CMA

## 2020-01-13 NOTE — LETTER
"1/13/2020       RE: Adarsh Lozano  [address]  [facility] [address]     Dear Colleague,    Thank you for referring your patient, Adarsh Lozano, to the [facility] [place] at [facility]. Please see a copy of my visit note below.    This very pleasant 49-year-old gentleman returns today for check cystoscopy.  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  This was carefully resected.  2 small recurrences were noted 6 months later also low-grade and another further 2 years after that 6 months ago we found to recurrent very tiny lesions in the bladder which were biopsied and fulgurated in the office.  He returns 6 months later for check cystoscopy.      Procedure.  Cystoscopy.   Surgeon.    Daisy  Anesthesia.  Local anesthesia.  Description.  With the patient in the supine position, with the genital area prepped and draped in the customary fashion, with local anesthetic and the urethra, the flexible cystoscope was Inserted.  Penile urethra is normal  External sphincter is intact  Prostatic urethra is normal  No evidence of recurrent tumor in the bladder  No other remarkable features    Impression  Goods progress  Discussed therapeutic alernatives in detail as well as current findings  Answered all questions    Plan  1 year for cystoscopy and cytology    \"This dictation was performed with voice recognition software and may contain errors,  omissions and inadvertent word substitution.\"      Again, thank you for allowing me to participate in the care of your patient.      Sincerely,    Aiden Villa MD      "

## 2020-01-20 LAB — COPATH REPORT: NORMAL

## 2020-02-28 DIAGNOSIS — J45.20 MILD INTERMITTENT ASTHMA WITHOUT COMPLICATION: ICD-10-CM

## 2020-02-28 RX ORDER — ALBUTEROL SULFATE 90 UG/1
AEROSOL, METERED RESPIRATORY (INHALATION)
Qty: 8.5 G | Refills: 14 | Status: SHIPPED | OUTPATIENT
Start: 2020-02-28 | End: 2021-03-29

## 2020-02-28 NOTE — TELEPHONE ENCOUNTER
Routing refill request to provider for review/approval because:  Drug not active on patient's medication list  Labs not current:  ACT  Alda Martinez RN, BSN

## 2020-05-07 ENCOUNTER — TELEPHONE (OUTPATIENT)
Dept: FAMILY MEDICINE | Facility: CLINIC | Age: 50
End: 2020-05-07

## 2020-05-07 NOTE — TELEPHONE ENCOUNTER
Patient Quality Outreach Summary      Summary:    Patient is due/failing the following:   ACT needed    Type of outreach:    Sent 100Plushart message.    Questions for provider review:    None                                                                                                                    NEVILLE Lundy       Chart routed to Care Team.

## 2020-08-18 DIAGNOSIS — I10 BENIGN HYPERTENSION: ICD-10-CM

## 2020-08-19 RX ORDER — LISINOPRIL 10 MG/1
TABLET ORAL
Qty: 90 TABLET | Refills: 0 | Status: SHIPPED | OUTPATIENT
Start: 2020-08-19 | End: 2021-03-29

## 2020-08-19 NOTE — TELEPHONE ENCOUNTER
Left detailed message on patients voice mail to call and schedule a annual visit for future refills.    Josselin Pires MA

## 2020-08-19 NOTE — TELEPHONE ENCOUNTER
Prescription approved per G Refill Protocol.  LAST REFILL, PT NEEDS ANNUAL VISIT, INFORM PT, routed to [facility], please call pt to schedule  Nicki Donovan RN, BSN  Message handled by CLINIC NURSE.

## 2020-11-29 ENCOUNTER — HEALTH MAINTENANCE LETTER (OUTPATIENT)
Age: 50
End: 2020-11-29

## 2021-01-11 DIAGNOSIS — C67.9 MALIGNANT NEOPLASM OF URINARY BLADDER, UNSPECIFIED SITE (H): Primary | ICD-10-CM

## 2021-01-14 ENCOUNTER — OFFICE VISIT (OUTPATIENT)
Dept: UROLOGY | Facility: CLINIC | Age: 51
End: 2021-01-14
Payer: COMMERCIAL

## 2021-01-14 DIAGNOSIS — Z85.51 PERSONAL HISTORY OF MALIGNANT NEOPLASM OF BLADDER: Primary | ICD-10-CM

## 2021-01-14 LAB
ALBUMIN UR-MCNC: NEGATIVE MG/DL
APPEARANCE UR: CLEAR
BILIRUB UR QL STRIP: NEGATIVE
COLOR UR AUTO: YELLOW
GLUCOSE UR STRIP-MCNC: NEGATIVE MG/DL
HGB UR QL STRIP: NEGATIVE
KETONES UR STRIP-MCNC: NEGATIVE MG/DL
LEUKOCYTE ESTERASE UR QL STRIP: NEGATIVE
NITRATE UR QL: NEGATIVE
PH UR STRIP: 6 PH (ref 5–7)
SOURCE: NORMAL
SP GR UR STRIP: 1.02 (ref 1–1.03)
UROBILINOGEN UR STRIP-ACNC: 0.2 EU/DL (ref 0.2–1)

## 2021-01-14 PROCEDURE — 81003 URINALYSIS AUTO W/O SCOPE: CPT | Performed by: UROLOGY

## 2021-01-14 PROCEDURE — 88112 CYTOPATH CELL ENHANCE TECH: CPT | Performed by: PATHOLOGY

## 2021-01-14 PROCEDURE — 99215 OFFICE O/P EST HI 40 MIN: CPT | Mod: 25 | Performed by: UROLOGY

## 2021-01-14 PROCEDURE — 52000 CYSTOURETHROSCOPY: CPT | Performed by: UROLOGY

## 2021-01-14 RX ORDER — CIPROFLOXACIN 500 MG/1
500 TABLET, FILM COATED ORAL ONCE
Qty: 1 TABLET | Refills: 0 | Status: SHIPPED | OUTPATIENT
Start: 2021-01-14 | End: 2021-01-14

## 2021-01-14 RX ORDER — LIDOCAINE HYDROCHLORIDE 20 MG/ML
JELLY TOPICAL ONCE
Status: COMPLETED | OUTPATIENT
Start: 2021-01-14 | End: 2021-01-14

## 2021-01-14 RX ADMIN — LIDOCAINE HYDROCHLORIDE: 20 JELLY TOPICAL at 14:14

## 2021-01-14 ASSESSMENT — PAIN SCALES - GENERAL: PAINLEVEL: NO PAIN (0)

## 2021-01-14 ASSESSMENT — MIFFLIN-ST. JEOR: SCORE: 1690.46

## 2021-01-14 NOTE — PROGRESS NOTES
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  This was carefully resected.  2 small recurrences were noted 6 months later also low-grade and another further 2 years after that 6 months ago we found to recurrent very tiny lesions in the bladder which were biopsied and fulgurated in the office.  His last cystoscopy was 1 year ago and was quite negative.    The patient has no other major complaints at the present time, there is been no evidence of blood in the urine, his urine stream is satisfactory.  Urinalysis today is negative.    Past Medical History:   Diagnosis Date     Environmental allergies      Hypertension      Uncomplicated asthma        Past Surgical History:   Procedure Laterality Date     BLADDER SURGERY       CYSTOSCOPY       CYSTOSCOPY, RETROGRADES, COMBINED  3/24/2014    Procedure: COMBINED CYSTOSCOPY, RETROGRADES;;  Surgeon: Aiden Villa MD;  Location:  OR     CYSTOSCOPY, TRANSURETHRAL RESECTION (TUR) TUMOR BLADDER, COMBINED  3/24/2014    Procedure: COMBINED CYSTOSCOPY, TRANSURETHRAL RESECTION (TUR) TUMOR BLADDER;  CYSTOSCOPY, BILATERAL RETROGRADES, TRANSURETHRAL RESECTION BLADDER TUMOR ;  Surgeon: Aiden Villa MD;  Location:  OR       Current Outpatient Medications:      ciprofloxacin (CIPRO) 500 MG tablet, Take 1 tablet (500 mg) by mouth once for 1 dose, Disp: 1 tablet, Rfl: 0     lisinopril (ZESTRIL) 10 MG tablet, TAKE 1 TABLET DAILY, Disp: 90 tablet, Rfl: 0     PROAIR  (90 Base) MCG/ACT inhaler, USE 2 INHALATIONS EVERY 6 HOURS (PLEASE SEE MAYRA PHILIP MD IN DECEMBER 2018), Disp: 8.5 g, Rfl: 14  No current facility-administered medications for this visit.       ROS: 10 point ROS neg other than the symptoms noted above in the HPI.    There were no vitals taken for this visit.       Procedure.   "Cystoscopy.   Surgeon.    Daisy  Anesthesia.  Local anesthesia.  Description.  With the patient in the supine position, with the genital area prepped and draped in the customary fashion, with local anesthetic and the urethra, the flexible cystoscope was Inserted.  The penile urethra is normal.  The external sphincter is intact.  When viewed from verumontanum there is minimal enlargement of the lateral lobes of the prostate  There is no significant median lobe.  There is no evidence of recurrent neoplasm in the bladder.  There is no evidence of stone formation.  The ureteral orifices and trigone are quite unremarkable.  There were no other significant findings.    Impression.  The urothelium remained stable 7 years after initial identification and diagnosis of low-grade superficial bladder cancer after an episode of gross hematuria.  He continues to do very well.  I recommend we repeat cystoscopy again in 1 years to time along with a urine cytology.  I did discuss the findings with him in detail today, I reviewed his records, labs and other pertinent studies.  I went over the previous pathology reports.  I explained the reasons for ongoing surveillance, the possibility of recurrence again in the future as an issue, and other potential therapeutic options may need to be considered given those types of circumstances.  I did go over the entire situation with the patient in detail today.  , All pertinent labs records radiologic studies etc. were reviewed with the patient.  I addressed and answered all questions.    Plan.  1 year for cystoscopy and urine cytology.    Time.  50 minutes spent in addition to the procedure in order to discuss and review of his records, go over the findings, discussed the plans for follow-up, discuss potential alternative therapeutic strategies given this this is a malignant tumor.    \"This dictation was performed with voice recognition software and may contain errors,  omissions and " "inadvertent word substitution.\"      "

## 2021-01-14 NOTE — NURSING NOTE
Prior to the start of the procedure and with procedural staff participation, I verbally confirmed the patient s identity using two indicators, relevant allergies, that the procedure was appropriate and matched the consent or emergent situation, and that the correct equipment/implants were available. Immediately prior to starting the procedure I conducted the Time Out with the procedural staff and re-confirmed the patient s name, procedure, and site/side. I have wiped the patient off with the povidone-Iodine solution, draped them,  used Lidocaine hydrochloride jelly, and instilled sterile water into the bladder. (The Joint Commission universal protocol was followed.)  Yes    Sedation (Moderate or Deep): None    5mL 2% lidocaine hydrochloride Urojet instilled into urethra.    NDC# [address]-1  Lot #:[id number]  Expiration Date:6-22 -

## 2021-01-14 NOTE — LETTER
1/14/2021       RE: Adarsh Lozano  [address] [facility] [address]     Dear Colleague,    Thank you for referring your patient, Adarsh Lozano, to the [facility] [place] at [facility]. Please see a copy of my visit note below.    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  This was carefully resected.  2 small recurrences were noted 6 months later also low-grade and another further 2 years after that 6 months ago we found to recurrent very tiny lesions in the bladder which were biopsied and fulgurated in the office.  His last cystoscopy was 1 year ago and was quite negative.    The patient has no other major complaints at the present time, there is been no evidence of blood in the urine, his urine stream is satisfactory.  Urinalysis today is negative.    Past Medical History:   Diagnosis Date     Environmental allergies      Hypertension      Uncomplicated asthma        Past Surgical History:   Procedure Laterality Date     BLADDER SURGERY       CYSTOSCOPY       CYSTOSCOPY, RETROGRADES, COMBINED  3/24/2014    Procedure: COMBINED CYSTOSCOPY, RETROGRADES;;  Surgeon: Aiden Villa MD;  Location:  OR     CYSTOSCOPY, TRANSURETHRAL RESECTION (TUR) TUMOR BLADDER, COMBINED  3/24/2014    Procedure: COMBINED CYSTOSCOPY, TRANSURETHRAL RESECTION (TUR) TUMOR BLADDER;  CYSTOSCOPY, BILATERAL RETROGRADES, TRANSURETHRAL RESECTION BLADDER TUMOR ;  Surgeon: Aiden Villa MD;  Location:  OR       Current Outpatient Medications:      ciprofloxacin (CIPRO) 500 MG tablet, Take 1 tablet (500 mg) by mouth once for 1 dose, Disp: 1 tablet, Rfl: 0     lisinopril (ZESTRIL) 10 MG tablet, TAKE 1 TABLET DAILY, Disp: 90 tablet, Rfl: 0     PROAIR  (90 Base) MCG/ACT inhaler, USE 2  INHALATIONS EVERY 6 HOURS (PLEASE SEE MAYRA PHILIP MD IN DECEMBER 2018), Disp: 8.5 g, Rfl: 14  No current facility-administered medications for this visit.       ROS: 10 point ROS neg other than the symptoms noted above in the HPI.    There were no vitals taken for this visit.       Procedure.  Cystoscopy.   Surgeon.    Daisy  Anesthesia.  Local anesthesia.  Description.  With the patient in the supine position, with the genital area prepped and draped in the customary fashion, with local anesthetic and the urethra, the flexible cystoscope was Inserted.  The penile urethra is normal.  The external sphincter is intact.  When viewed from verumontanum there is minimal enlargement of the lateral lobes of the prostate  There is no significant median lobe.  There is no evidence of recurrent neoplasm in the bladder.  There is no evidence of stone formation.  The ureteral orifices and trigone are quite unremarkable.  There were no other significant findings.    Impression.  The urothelium remained stable 7 years after initial identification and diagnosis of low-grade superficial bladder cancer after an episode of gross hematuria.  He continues to do very well.  I recommend we repeat cystoscopy again in 1 years to time along with a urine cytology.  I did discuss the findings with him in detail today, I reviewed his records, labs and other pertinent studies.  I went over the previous pathology reports.  I explained the reasons for ongoing surveillance, the possibility of recurrence again in the future as an issue, and other potential therapeutic options may need to be considered given those types of circumstances.  I did go over the entire situation with the patient in detail today.  , All pertinent labs records radiologic studies etc. were reviewed with the patient.  I addressed and answered all questions.    Plan.  1 year for cystoscopy and urine cytology.    Time.  50 minutes spent in addition to the procedure in  "order to discuss and review of his records, go over the findings, discussed the plans for follow-up, discuss potential alternative therapeutic strategies given this this is a malignant tumor.    \"This dictation was performed with voice recognition software and may contain errors,  omissions and inadvertent word substitution.\"      Sincerely,    Aiden Villa MD      "

## 2021-01-18 LAB — COPATH REPORT: NORMAL

## 2021-03-25 ASSESSMENT — ENCOUNTER SYMPTOMS
DYSURIA: 0
PARESTHESIAS: 0
CHILLS: 0
CONSTIPATION: 0
DIZZINESS: 0
EYE PAIN: 0
ABDOMINAL PAIN: 0
NERVOUS/ANXIOUS: 0
SHORTNESS OF BREATH: 0
HEADACHES: 0
WEAKNESS: 0
HEMATURIA: 0
FEVER: 0
DIARRHEA: 0
SORE THROAT: 0
FREQUENCY: 0
COUGH: 0
PALPITATIONS: 0
NAUSEA: 0
HEMATOCHEZIA: 0

## 2021-03-29 ENCOUNTER — OFFICE VISIT (OUTPATIENT)
Dept: FAMILY MEDICINE | Facility: CLINIC | Age: 51
End: 2021-03-29
Payer: COMMERCIAL

## 2021-03-29 VITALS
HEIGHT: 68 IN | DIASTOLIC BLOOD PRESSURE: 78 MMHG | HEART RATE: 71 BPM | OXYGEN SATURATION: 98 % | TEMPERATURE: 98 F | WEIGHT: 197 LBS | SYSTOLIC BLOOD PRESSURE: 122 MMHG | BODY MASS INDEX: 29.86 KG/M2 | RESPIRATION RATE: 16 BRPM

## 2021-03-29 DIAGNOSIS — I10 BENIGN HYPERTENSION: ICD-10-CM

## 2021-03-29 DIAGNOSIS — J45.20 MILD INTERMITTENT ASTHMA WITHOUT COMPLICATION: ICD-10-CM

## 2021-03-29 PROCEDURE — 80061 LIPID PANEL: CPT | Performed by: FAMILY MEDICINE

## 2021-03-29 PROCEDURE — 80053 COMPREHEN METABOLIC PANEL: CPT | Performed by: FAMILY MEDICINE

## 2021-03-29 PROCEDURE — 99214 OFFICE O/P EST MOD 30 MIN: CPT | Performed by: FAMILY MEDICINE

## 2021-03-29 PROCEDURE — 36415 COLL VENOUS BLD VENIPUNCTURE: CPT | Performed by: FAMILY MEDICINE

## 2021-03-29 RX ORDER — ALBUTEROL SULFATE 90 UG/1
AEROSOL, METERED RESPIRATORY (INHALATION)
Qty: 8.5 G | Refills: 1 | Status: SHIPPED | OUTPATIENT
Start: 2021-03-29 | End: 2022-09-23

## 2021-03-29 RX ORDER — LISINOPRIL 10 MG/1
10 TABLET ORAL DAILY
Qty: 90 TABLET | Refills: 3 | Status: SHIPPED | OUTPATIENT
Start: 2021-03-29 | End: 2022-09-26

## 2021-03-29 ASSESSMENT — MIFFLIN-ST. JEOR: SCORE: 1742.06

## 2021-03-29 NOTE — LETTER
March 31, 2021      Adarsh Lozano  [address] [facility] [address]        Dear ,    We are writing to inform you of your test results.    Blood sugar, liver, kidneys all normal cholesterol reasonable, extra weight gives us triglycerides and LDL cholesterol         Resulted Orders   Lipid panel reflex to direct LDL Non-fasting   Result Value Ref Range    Cholesterol 219 (H) <200 mg/dL      Comment:      Desirable:       <200 mg/dl    Triglycerides 257 (H) <150 mg/dL      Comment:      Borderline high:  150-199 mg/dl  High:             200-499 mg/dl  Very high:       >499 mg/dl  Fasting specimen      HDL Cholesterol 39 (L) >39 mg/dL    LDL Cholesterol Calculated 129 (H) <100 mg/dL      Comment:      Above desirable:  100-129 mg/dl  Borderline High:  130-159 mg/dL  High:             160-189 mg/dL  Very high:       >189 mg/dl      Non HDL Cholesterol 180 (H) <130 mg/dL      Comment:      Above Desirable:  130-159 mg/dl  Borderline high:  160-189 mg/dl  High:             190-219 mg/dl  Very high:       >219 mg/dl     Comprehensive metabolic panel   Result Value Ref Range    Sodium 138 133 - 144 mmol/L    Potassium 3.8 3.4 - 5.3 mmol/L    Chloride 105 94 - 109 mmol/L    Carbon Dioxide 26 20 - 32 mmol/L    Anion Gap 7 3 - 14 mmol/L    Glucose 74 70 - 99 mg/dL      Comment:      Fasting specimen    Urea Nitrogen 12 7 - 30 mg/dL    Creatinine 0.93 0.66 - 1.25 mg/dL    GFR Estimate >90 >60 mL/min/[1.73_m2]      Comment:      Non  GFR Calc  Starting 12/18/2018, serum creatinine based estimated GFR (eGFR) will be   calculated using the Chronic Kidney Disease Epidemiology Collaboration   (CKD-EPI) equation.      GFR Estimate If Black >90 >60 mL/min/[1.73_m2]      Comment:       GFR Calc  Starting 12/18/2018, serum creatinine based estimated GFR (eGFR) will be   calculated using the Chronic Kidney Disease Epidemiology Collaboration   (CKD-EPI) equation.      Calcium 9.3 8.5 -  10.1 mg/dL    Bilirubin Total 0.4 0.2 - 1.3 mg/dL    Albumin 4.3 3.4 - 5.0 g/dL    Protein Total 7.7 6.8 - 8.8 g/dL    Alkaline Phosphatase 68 40 - 150 U/L    ALT 55 0 - 70 U/L    AST 23 0 - 45 U/L       If you have any questions or concerns, please call the clinic at the number listed above.       Sincerely,      Dayron Grewal MD

## 2021-03-29 NOTE — PROGRESS NOTES
"        Subjective   Adarsh is a 50 year old who presents for the following health issues follow up asthma, hypertension, both mild and his progress after surgery for   Bladder cancer     HPI     Hypertension Follow-up      Do you check your blood pressure regularly outside of the clinic? Yes     Are you following a low salt diet? No    Are your blood pressures ever more than 140 on the top number (systolic) OR more   than 90 on the bottom number (diastolic), for example 140/90? No    Asthma Follow-Up    Was ACT completed today?    Yes    ACT Total Scores 3/29/2021   ACT TOTAL SCORE -   ASTHMA ER VISITS -   ASTHMA HOSPITALIZATIONS -   ACT TOTAL SCORE (Goal Greater than or Equal to 20) 25   In the past 12 months, how many times did you visit the emergency room for your asthma without being admitted to the hospital? 0   In the past 12 months, how many times were you hospitalized overnight because of your asthma? 0          How many days per week do you miss taking your asthma controller medication?  I do not have an asthma controller medication    Please describe any recent triggers for your asthma: None    Have you had any Emergency Room Visits, Urgent Care Visits, or Hospital Admissions since your last office visit?  No            Review of Systems   Constitutional, HEENT, cardiovascular, pulmonary, GI, , musculoskeletal, neuro, skin, endocrine and psych systems are negative, except as otherwise noted.      Objective    /78 (BP Location: Right arm, Patient Position: Chair, Cuff Size: Adult Large)   Pulse 71   Temp 98  F (36.7  C) (Oral)   Resp 16   Ht 1.734 m (5' 8.25\")   Wt 89.4 kg (197 lb)   SpO2 98%   BMI 29.73 kg/m    Body mass index is 29.73 kg/m .  Physical Exam   GENERAL: healthy, alert and no distress  EYES: Eyes grossly normal to inspection, PERRL and conjunctivae and sclerae normal  HENT: ear canals and TM's normal, nose and mouth without ulcers or lesions  NECK: no adenopathy, no " asymmetry, masses, or scars and thyroid normal to palpation  RESP: lungs clear to auscultation - no rales, rhonchi or wheezes  CV: regular rate and rhythm, normal S1 S2, no S3 or S4, no murmur, click or rub, no peripheral edema and peripheral pulses strong  ABDOMEN: soft, nontender, no hepatosplenomegaly, no masses and bowel sounds normal  MS: no gross musculoskeletal defects noted, no edema  SKIN: no suspicious lesions or rashes  NEURO: Normal strength and tone, mentation intact and speech normal  PSYCH: mentation appears normal, affect normal/bright    Fit and active, needs lipid and glucose evaluation     (I10) Benign hypertension  Comment:   Plan: lisinopril (ZESTRIL) 10 MG tablet, Lipid panel         reflex to direct LDL Non-fasting, Comprehensive        metabolic panel        At goal    (J45.20) Mild intermittent asthma without complication  Comment:   Plan: albuterol (PROAIR HFA) 108 (90 Base) MCG/ACT         inhaler        Less than one time per month

## 2021-03-30 LAB
ALBUMIN SERPL-MCNC: 4.3 G/DL (ref 3.4–5)
ALP SERPL-CCNC: 68 U/L (ref 40–150)
ALT SERPL W P-5'-P-CCNC: 55 U/L (ref 0–70)
ANION GAP SERPL CALCULATED.3IONS-SCNC: 7 MMOL/L (ref 3–14)
AST SERPL W P-5'-P-CCNC: 23 U/L (ref 0–45)
BILIRUB SERPL-MCNC: 0.4 MG/DL (ref 0.2–1.3)
BUN SERPL-MCNC: 12 MG/DL (ref 7–30)
CALCIUM SERPL-MCNC: 9.3 MG/DL (ref 8.5–10.1)
CHLORIDE SERPL-SCNC: 105 MMOL/L (ref 94–109)
CHOLEST SERPL-MCNC: 219 MG/DL
CO2 SERPL-SCNC: 26 MMOL/L (ref 20–32)
CREAT SERPL-MCNC: 0.93 MG/DL (ref 0.66–1.25)
GFR SERPL CREATININE-BSD FRML MDRD: >90 ML/MIN/{1.73_M2}
GLUCOSE SERPL-MCNC: 74 MG/DL (ref 70–99)
HDLC SERPL-MCNC: 39 MG/DL
LDLC SERPL CALC-MCNC: 129 MG/DL
NONHDLC SERPL-MCNC: 180 MG/DL
POTASSIUM SERPL-SCNC: 3.8 MMOL/L (ref 3.4–5.3)
PROT SERPL-MCNC: 7.7 G/DL (ref 6.8–8.8)
SODIUM SERPL-SCNC: 138 MMOL/L (ref 133–144)
TRIGL SERPL-MCNC: 257 MG/DL

## 2021-03-30 ASSESSMENT — ASTHMA QUESTIONNAIRES: ACT_TOTALSCORE: 25

## 2021-09-19 ENCOUNTER — HEALTH MAINTENANCE LETTER (OUTPATIENT)
Age: 51
End: 2021-09-19

## 2021-10-29 ENCOUNTER — TELEPHONE (OUTPATIENT)
Dept: UROLOGY | Facility: CLINIC | Age: 51
End: 2021-10-29

## 2021-10-29 NOTE — TELEPHONE ENCOUNTER
[facility] Call Center    Phone Message    May a detailed message be left on voicemail: yes     Reason for Call: Other: Pt called in to schedule his annual cysto, please call him back.     Action Taken: Message routed to:  Other: [facility]    Travel Screening: Not Applicable

## 2022-01-09 ENCOUNTER — HEALTH MAINTENANCE LETTER (OUTPATIENT)
Age: 52
End: 2022-01-09

## 2022-01-17 ENCOUNTER — OFFICE VISIT (OUTPATIENT)
Dept: UROLOGY | Facility: CLINIC | Age: 52
End: 2022-01-17
Payer: COMMERCIAL

## 2022-01-17 VITALS
DIASTOLIC BLOOD PRESSURE: 70 MMHG | SYSTOLIC BLOOD PRESSURE: 130 MMHG | WEIGHT: 195 LBS | HEIGHT: 67 IN | HEART RATE: 78 BPM | BODY MASS INDEX: 30.61 KG/M2

## 2022-01-17 DIAGNOSIS — C67.9 MALIGNANT NEOPLASM OF URINARY BLADDER, UNSPECIFIED SITE (H): Primary | ICD-10-CM

## 2022-01-17 DIAGNOSIS — Z79.2 PROPHYLACTIC ANTIBIOTIC: ICD-10-CM

## 2022-01-17 LAB
ALBUMIN UR-MCNC: NEGATIVE MG/DL
APPEARANCE UR: CLEAR
BILIRUB UR QL STRIP: NEGATIVE
COLOR UR AUTO: YELLOW
GLUCOSE UR STRIP-MCNC: NEGATIVE MG/DL
HGB UR QL STRIP: NEGATIVE
KETONES UR STRIP-MCNC: NEGATIVE MG/DL
LEUKOCYTE ESTERASE UR QL STRIP: NEGATIVE
NITRATE UR QL: NEGATIVE
PH UR STRIP: 7 [PH] (ref 5–7)
SP GR UR STRIP: 1.02 (ref 1–1.03)
UROBILINOGEN UR STRIP-ACNC: 0.2 E.U./DL

## 2022-01-17 PROCEDURE — 52000 CYSTOURETHROSCOPY: CPT | Performed by: STUDENT IN AN ORGANIZED HEALTH CARE EDUCATION/TRAINING PROGRAM

## 2022-01-17 PROCEDURE — 81003 URINALYSIS AUTO W/O SCOPE: CPT | Mod: QW | Performed by: STUDENT IN AN ORGANIZED HEALTH CARE EDUCATION/TRAINING PROGRAM

## 2022-01-17 PROCEDURE — 99213 OFFICE O/P EST LOW 20 MIN: CPT | Mod: 25 | Performed by: STUDENT IN AN ORGANIZED HEALTH CARE EDUCATION/TRAINING PROGRAM

## 2022-01-17 RX ORDER — CIPROFLOXACIN 500 MG/1
500 TABLET, FILM COATED ORAL ONCE
Qty: 1 TABLET | Refills: 0 | Status: SHIPPED | OUTPATIENT
Start: 2022-01-17 | End: 2022-01-17

## 2022-01-17 RX ORDER — LIDOCAINE HYDROCHLORIDE 20 MG/ML
JELLY TOPICAL ONCE
Status: COMPLETED | OUTPATIENT
Start: 2022-01-17 | End: 2022-01-17

## 2022-01-17 RX ADMIN — LIDOCAINE HYDROCHLORIDE: 20 JELLY TOPICAL at 13:35

## 2022-01-17 ASSESSMENT — PAIN SCALES - GENERAL: PAINLEVEL: NO PAIN (0)

## 2022-01-17 ASSESSMENT — MIFFLIN-ST. JEOR: SCORE: 1708.14

## 2022-01-17 NOTE — PROGRESS NOTES
"CHIEF COMPLAINT   Adarsh Lozano who is a 51 year old male returns today for follow-up of bladder cancer (low grade, first diagnosed 2014).      HPI   Adarsh Lozano is a 51 year old male returns today for follow-up of bladder cancer (low grade, first diagnosed 2014) with several recurrences treated with office biopsy and fulguration of tumor, most recent recurrence in 2018    He denies any recent gross hematuria or difficulty with urination  nonsmoker    PHYSICAL EXAM  Patient is a 51 year old  male   Vitals: Blood pressure 130/70, pulse 78, height 1.702 m (5' 7\"), weight 88.5 kg (195 lb).  Body mass index is 30.54 kg/m .  General Appearance Adult:   Alert, no acute distress, oriented  HENT: throat/mouth:normal, good dentition  Lungs: no respiratory distress, or pursed lip breathing  Heart: No obvious jugular venous distension present  Abdomen: soft, nontender, no organomegaly or masses  Musculoskeltal: extremities normal, no peripheral edema  Skin: no suspicious lesions or rashes  Neuro: Alert, oriented, speech and mentation normal  Psych: affect and mood normal  Gait: Normal  : circ phallus    All pertinent imaging reviewed:    No recent imaging to review    ASSESSMENT and PLAN  51 year old male returns today for follow-up of bladder cancer (low grade, first diagnosed 2014) with several recurrences treated with office biopsy and fulguration of tumor, most recent recurrence in 2018      PRE-PROCEDURE DIAGNOSIS: h/o bladder cancer    POST-PROCEDURE DIAGNOSIS: h/o bladder cancer    PROCEDURE: Cystoscopy    DESCRIPTION OF PROCEDURE: After informed consent was obtained, the patient was brought to the procedure room where he was placed in the supine position with all pressure points well padded.  The penis was prepped and draped in sterile fashion. A flexible cystoscope was introduced through a well-lubricated urethra.     Prostate about 3-4 cm with moderate bilobar hypertrophy, open bladder neck  Mild-moderate " trabeculation without cellules or diverticulae, no bladder stones  Stellate scar noted on posterior bladder  No evidence of recurrent papillary tumor, no concerning erythema    The flexible cystoscope was removed and the findings were described to the patient.     ASSESSMENT AND PLAN: 51 year old male returns today for follow-up of bladder cancer (low grade, first diagnosed 2014) with several recurrences treated with office biopsy and fulguration of tumor, most recent recurrence in 2018, no recurrence today. Discussed the importance of ongoing screening cystoscopy, likely indefinitely given his prior history.    - return 1 year for cystoscopy    Leo Juárez MD   [facility]  [phone] clinic phone

## 2022-01-17 NOTE — NURSING NOTE
Chief Complaint   Patient presents with     malignant neoplasm of urinary bladder     in office cystoscopy today   Prior to the start of the procedure and with procedural staff participation, I verbally confirmed the patient s identity using two indicators, relevant allergies, that the procedure was appropriate and matched the consent or emergent situation, and that the correct equipment/implants were available. Immediately prior to starting the procedure I conducted the Time Out with the procedural staff and re-confirmed the patient s name, procedure, and site/side. I have wiped the patient off with the povidone-Iodine solution, draped them,  used Lidocaine hydrochloride jelly, and instilled sterile water into the bladder. (The Joint Commission universal protocol was followed.)  Yes    Sedation (Moderate or Deep): None  5mL 2% lidocaine hydrochloride Urojet instilled into urethra.    NDC# [address]-4  Lot #: [id number]  Expiration Date:  7-23  Nancy Dietrich LPN

## 2022-01-17 NOTE — LETTER
"1/17/2022       RE: Adarsh Lozano  [address] [facility] [address]     Dear Colleague,    Thank you for referring your patient, Adarsh Lozano, to the [facility]. Please see a copy of my visit note below.    CHIEF COMPLAINT   Adarsh Lozano who is a 51 year old male returns today for follow-up of bladder cancer (low grade, first diagnosed 2014).      HPI   Adarsh Lozano is a 51 year old male returns today for follow-up of bladder cancer (low grade, first diagnosed 2014) with several recurrences treated with office biopsy and fulguration of tumor, most recent recurrence in 2018    He denies any recent gross hematuria or difficulty with urination  nonsmoker    PHYSICAL EXAM  Patient is a 51 year old  male   Vitals: Blood pressure 130/70, pulse 78, height 1.702 m (5' 7\"), weight 88.5 kg (195 lb).  Body mass index is 30.54 kg/m .  General Appearance Adult:   Alert, no acute distress, oriented  HENT: throat/mouth:normal, good dentition  Lungs: no respiratory distress, or pursed lip breathing  Heart: No obvious jugular venous distension present  Abdomen: soft, nontender, no organomegaly or masses  Musculoskeltal: extremities normal, no peripheral edema  Skin: no suspicious lesions or rashes  Neuro: Alert, oriented, speech and mentation normal  Psych: affect and mood normal  Gait: Normal  : circ phallus    All pertinent imaging reviewed:    No recent imaging to review    ASSESSMENT and PLAN  51 year old male returns today for follow-up of bladder cancer (low grade, first diagnosed 2014) with several recurrences treated with office biopsy and fulguration of tumor, most recent recurrence in 2018      PRE-PROCEDURE DIAGNOSIS: h/o bladder cancer    POST-PROCEDURE DIAGNOSIS: h/o bladder cancer    PROCEDURE: Cystoscopy    DESCRIPTION OF PROCEDURE: After informed consent was obtained, the patient was brought to the " procedure room where he was placed in the supine position with all pressure points well padded.  The penis was prepped and draped in sterile fashion. A flexible cystoscope was introduced through a well-lubricated urethra.     Prostate about 3-4 cm with moderate bilobar hypertrophy, open bladder neck  Mild-moderate trabeculation without cellules or diverticulae, no bladder stones  Stellate scar noted on posterior bladder  No evidence of recurrent papillary tumor, no concerning erythema    The flexible cystoscope was removed and the findings were described to the patient.     ASSESSMENT AND PLAN: 51 year old male returns today for follow-up of bladder cancer (low grade, first diagnosed 2014) with several recurrences treated with office biopsy and fulguration of tumor, most recent recurrence in 2018, no recurrence today. Discussed the importance of ongoing screening cystoscopy, likely indefinitely given his prior history.    - return 1 year for cystoscopy    Leo Juárez MD   [facility]  [phone] clinic phone

## 2022-01-17 NOTE — NURSING NOTE
5mL 2% lidocaine hydrochloride Urojet instilled into urethra.    NDC# [address]-9  Lot #: [id number]  Expiration Date:  7/23

## 2022-01-17 NOTE — PATIENT INSTRUCTIONS

## 2022-09-24 DIAGNOSIS — I10 BENIGN HYPERTENSION: ICD-10-CM

## 2022-09-26 RX ORDER — LISINOPRIL 10 MG/1
TABLET ORAL
Qty: 90 TABLET | Refills: 0 | Status: SHIPPED | OUTPATIENT
Start: 2022-09-26 | End: 2022-11-23

## 2022-09-26 NOTE — TELEPHONE ENCOUNTER
Routing refill request to provider for review/approval because:  Labs not current:  BMP  Patient needs to be seen because it has been more than 1 year since last office visit.    María Elena Stephenson RN

## 2022-11-21 ENCOUNTER — HEALTH MAINTENANCE LETTER (OUTPATIENT)
Age: 52
End: 2022-11-21

## 2022-11-23 ENCOUNTER — OFFICE VISIT (OUTPATIENT)
Dept: FAMILY MEDICINE | Facility: CLINIC | Age: 52
End: 2022-11-23
Attending: PHYSICIAN ASSISTANT
Payer: COMMERCIAL

## 2022-11-23 VITALS
WEIGHT: 201.5 LBS | OXYGEN SATURATION: 98 % | BODY MASS INDEX: 31.63 KG/M2 | SYSTOLIC BLOOD PRESSURE: 124 MMHG | HEART RATE: 81 BPM | TEMPERATURE: 98.3 F | HEIGHT: 67 IN | RESPIRATION RATE: 16 BRPM | DIASTOLIC BLOOD PRESSURE: 80 MMHG

## 2022-11-23 DIAGNOSIS — Z00.00 ROUTINE GENERAL MEDICAL EXAMINATION AT A HEALTH CARE FACILITY: Primary | ICD-10-CM

## 2022-11-23 DIAGNOSIS — J45.20 MILD INTERMITTENT ASTHMA WITHOUT COMPLICATION: ICD-10-CM

## 2022-11-23 DIAGNOSIS — Z11.59 NEED FOR HEPATITIS C SCREENING TEST: ICD-10-CM

## 2022-11-23 DIAGNOSIS — Z11.4 SCREENING FOR HIV (HUMAN IMMUNODEFICIENCY VIRUS): ICD-10-CM

## 2022-11-23 DIAGNOSIS — Z23 NEED FOR VACCINATION: ICD-10-CM

## 2022-11-23 DIAGNOSIS — Z12.11 SCREEN FOR COLON CANCER: ICD-10-CM

## 2022-11-23 DIAGNOSIS — I10 BENIGN HYPERTENSION: ICD-10-CM

## 2022-11-23 PROBLEM — Z85.51 PERSONAL HISTORY OF MALIGNANT NEOPLASM OF BLADDER: Status: ACTIVE | Noted: 2022-11-23

## 2022-11-23 LAB
ALBUMIN SERPL BCG-MCNC: 4.7 G/DL (ref 3.5–5.2)
ALP SERPL-CCNC: 72 U/L (ref 40–129)
ALT SERPL W P-5'-P-CCNC: 40 U/L (ref 10–50)
ANION GAP SERPL CALCULATED.3IONS-SCNC: 12 MMOL/L (ref 7–15)
AST SERPL W P-5'-P-CCNC: 34 U/L (ref 10–50)
BILIRUB SERPL-MCNC: 0.4 MG/DL
BUN SERPL-MCNC: 17.9 MG/DL (ref 6–20)
CALCIUM SERPL-MCNC: 9.3 MG/DL (ref 8.6–10)
CHLORIDE SERPL-SCNC: 103 MMOL/L (ref 98–107)
CHOLEST SERPL-MCNC: 220 MG/DL
CREAT SERPL-MCNC: 0.84 MG/DL (ref 0.67–1.17)
DEPRECATED HCO3 PLAS-SCNC: 21 MMOL/L (ref 22–29)
ERYTHROCYTE [DISTWIDTH] IN BLOOD BY AUTOMATED COUNT: 12.7 % (ref 10–15)
GFR SERPL CREATININE-BSD FRML MDRD: >90 ML/MIN/1.73M2
GLUCOSE SERPL-MCNC: 100 MG/DL (ref 70–99)
HBA1C MFR BLD: 5.4 % (ref 0–5.6)
HCT VFR BLD AUTO: 45.5 % (ref 40–53)
HCV AB SERPL QL IA: NONREACTIVE
HDLC SERPL-MCNC: 39 MG/DL
HGB BLD-MCNC: 15.6 G/DL (ref 13.3–17.7)
HIV 1+2 AB+HIV1 P24 AG SERPL QL IA: NONREACTIVE
LDLC SERPL CALC-MCNC: 127 MG/DL
MCH RBC QN AUTO: 31.3 PG (ref 26.5–33)
MCHC RBC AUTO-ENTMCNC: 34.3 G/DL (ref 31.5–36.5)
MCV RBC AUTO: 91 FL (ref 78–100)
NONHDLC SERPL-MCNC: 181 MG/DL
PLATELET # BLD AUTO: 237 10E3/UL (ref 150–450)
POTASSIUM SERPL-SCNC: 5 MMOL/L (ref 3.4–5.3)
PROT SERPL-MCNC: 7.5 G/DL (ref 6.4–8.3)
RBC # BLD AUTO: 4.99 10E6/UL (ref 4.4–5.9)
SODIUM SERPL-SCNC: 136 MMOL/L (ref 136–145)
TRIGL SERPL-MCNC: 269 MG/DL
WBC # BLD AUTO: 5.7 10E3/UL (ref 4–11)

## 2022-11-23 PROCEDURE — 80061 LIPID PANEL: CPT | Performed by: FAMILY MEDICINE

## 2022-11-23 PROCEDURE — 83036 HEMOGLOBIN GLYCOSYLATED A1C: CPT | Performed by: FAMILY MEDICINE

## 2022-11-23 PROCEDURE — 36415 COLL VENOUS BLD VENIPUNCTURE: CPT | Performed by: FAMILY MEDICINE

## 2022-11-23 PROCEDURE — 86803 HEPATITIS C AB TEST: CPT | Performed by: FAMILY MEDICINE

## 2022-11-23 PROCEDURE — 99396 PREV VISIT EST AGE 40-64: CPT | Mod: 25 | Performed by: FAMILY MEDICINE

## 2022-11-23 PROCEDURE — 87389 HIV-1 AG W/HIV-1&-2 AB AG IA: CPT | Performed by: FAMILY MEDICINE

## 2022-11-23 PROCEDURE — 80053 COMPREHEN METABOLIC PANEL: CPT | Performed by: FAMILY MEDICINE

## 2022-11-23 PROCEDURE — 90471 IMMUNIZATION ADMIN: CPT | Performed by: FAMILY MEDICINE

## 2022-11-23 PROCEDURE — 85027 COMPLETE CBC AUTOMATED: CPT | Performed by: FAMILY MEDICINE

## 2022-11-23 PROCEDURE — 90715 TDAP VACCINE 7 YRS/> IM: CPT | Performed by: FAMILY MEDICINE

## 2022-11-23 RX ORDER — ALBUTEROL SULFATE 90 UG/1
2 AEROSOL, METERED RESPIRATORY (INHALATION) EVERY 6 HOURS PRN
Qty: 8.5 G | Refills: 3 | Status: SHIPPED | OUTPATIENT
Start: 2022-11-23 | End: 2024-01-22

## 2022-11-23 RX ORDER — LISINOPRIL 10 MG/1
10 TABLET ORAL DAILY
Qty: 90 TABLET | Refills: 3 | Status: SHIPPED | OUTPATIENT
Start: 2022-11-23 | End: 2024-04-29

## 2022-11-23 SDOH — ECONOMIC STABILITY: INCOME INSECURITY: IN THE LAST 12 MONTHS, WAS THERE A TIME WHEN YOU WERE NOT ABLE TO PAY THE MORTGAGE OR RENT ON TIME?: PATIENT REFUSED

## 2022-11-23 SDOH — HEALTH STABILITY: PHYSICAL HEALTH: ON AVERAGE, HOW MANY DAYS PER WEEK DO YOU ENGAGE IN MODERATE TO STRENUOUS EXERCISE (LIKE A BRISK WALK)?: 1 DAY

## 2022-11-23 SDOH — ECONOMIC STABILITY: TRANSPORTATION INSECURITY
IN THE PAST 12 MONTHS, HAS THE LACK OF TRANSPORTATION KEPT YOU FROM MEDICAL APPOINTMENTS OR FROM GETTING MEDICATIONS?: PATIENT DECLINED

## 2022-11-23 SDOH — ECONOMIC STABILITY: TRANSPORTATION INSECURITY
IN THE PAST 12 MONTHS, HAS LACK OF TRANSPORTATION KEPT YOU FROM MEETINGS, WORK, OR FROM GETTING THINGS NEEDED FOR DAILY LIVING?: PATIENT DECLINED

## 2022-11-23 SDOH — ECONOMIC STABILITY: FOOD INSECURITY: WITHIN THE PAST 12 MONTHS, YOU WORRIED THAT YOUR FOOD WOULD RUN OUT BEFORE YOU GOT MONEY TO BUY MORE.: PATIENT DECLINED

## 2022-11-23 SDOH — ECONOMIC STABILITY: INCOME INSECURITY: HOW HARD IS IT FOR YOU TO PAY FOR THE VERY BASICS LIKE FOOD, HOUSING, MEDICAL CARE, AND HEATING?: PATIENT DECLINED

## 2022-11-23 SDOH — ECONOMIC STABILITY: FOOD INSECURITY: WITHIN THE PAST 12 MONTHS, THE FOOD YOU BOUGHT JUST DIDN'T LAST AND YOU DIDN'T HAVE MONEY TO GET MORE.: PATIENT DECLINED

## 2022-11-23 SDOH — HEALTH STABILITY: PHYSICAL HEALTH: ON AVERAGE, HOW MANY MINUTES DO YOU ENGAGE IN EXERCISE AT THIS LEVEL?: 20 MIN

## 2022-11-23 ASSESSMENT — PATIENT HEALTH QUESTIONNAIRE - PHQ9
SUM OF ALL RESPONSES TO PHQ QUESTIONS 1-9: 0
10. IF YOU CHECKED OFF ANY PROBLEMS, HOW DIFFICULT HAVE THESE PROBLEMS MADE IT FOR YOU TO DO YOUR WORK, TAKE CARE OF THINGS AT HOME, OR GET ALONG WITH OTHER PEOPLE: NOT DIFFICULT AT ALL
SUM OF ALL RESPONSES TO PHQ QUESTIONS 1-9: 0

## 2022-11-23 ASSESSMENT — ASTHMA QUESTIONNAIRES
QUESTION_3 LAST FOUR WEEKS HOW OFTEN DID YOUR ASTHMA SYMPTOMS (WHEEZING, COUGHING, SHORTNESS OF BREATH, CHEST TIGHTNESS OR PAIN) WAKE YOU UP AT NIGHT OR EARLIER THAN USUAL IN THE MORNING: NOT AT ALL
ACT_TOTALSCORE: 24
QUESTION_1 LAST FOUR WEEKS HOW MUCH OF THE TIME DID YOUR ASTHMA KEEP YOU FROM GETTING AS MUCH DONE AT WORK, SCHOOL OR AT HOME: NONE OF THE TIME
QUESTION_5 LAST FOUR WEEKS HOW WOULD YOU RATE YOUR ASTHMA CONTROL: COMPLETELY CONTROLLED
ACT_TOTALSCORE: 24
QUESTION_2 LAST FOUR WEEKS HOW OFTEN HAVE YOU HAD SHORTNESS OF BREATH: NOT AT ALL
QUESTION_4 LAST FOUR WEEKS HOW OFTEN HAVE YOU USED YOUR RESCUE INHALER OR NEBULIZER MEDICATION (SUCH AS ALBUTEROL): ONCE A WEEK OR LESS

## 2022-11-23 ASSESSMENT — LIFESTYLE VARIABLES
HOW OFTEN DO YOU HAVE A DRINK CONTAINING ALCOHOL: PATIENT DECLINED
HOW OFTEN DO YOU HAVE SIX OR MORE DRINKS ON ONE OCCASION: PATIENT DECLINED
HOW MANY STANDARD DRINKS CONTAINING ALCOHOL DO YOU HAVE ON A TYPICAL DAY: PATIENT DECLINED
AUDIT-C TOTAL SCORE: -1
SKIP TO QUESTIONS 9-10: 0

## 2022-11-23 ASSESSMENT — ENCOUNTER SYMPTOMS
PALPITATIONS: 0
PARESTHESIAS: 0
CONSTIPATION: 0
NAUSEA: 0
FEVER: 0
ABDOMINAL PAIN: 0
DIARRHEA: 0
SORE THROAT: 0
HEADACHES: 0
HEMATOCHEZIA: 0
EYE PAIN: 0
DIZZINESS: 0
JOINT SWELLING: 0
HEARTBURN: 0
FREQUENCY: 0
SHORTNESS OF BREATH: 0
DYSURIA: 0
MYALGIAS: 0
CHILLS: 0
HEMATURIA: 0
NERVOUS/ANXIOUS: 0
ARTHRALGIAS: 0
WEAKNESS: 0
COUGH: 0

## 2022-11-23 ASSESSMENT — PAIN SCALES - GENERAL: PAINLEVEL: NO PAIN (0)

## 2022-11-23 ASSESSMENT — SOCIAL DETERMINANTS OF HEALTH (SDOH)
IN A TYPICAL WEEK, HOW MANY TIMES DO YOU TALK ON THE PHONE WITH FAMILY, FRIENDS, OR NEIGHBORS?: TWICE A WEEK
HOW OFTEN DO YOU ATTEND CHURCH OR RELIGIOUS SERVICES?: PATIENT DECLINED
HOW OFTEN DO YOU GET TOGETHER WITH FRIENDS OR RELATIVES?: PATIENT DECLINED
DO YOU BELONG TO ANY CLUBS OR ORGANIZATIONS SUCH AS CHURCH GROUPS UNIONS, FRATERNAL OR ATHLETIC GROUPS, OR SCHOOL GROUPS?: PATIENT DECLINED

## 2022-11-23 NOTE — LETTER
My Asthma Action Plan    Name: Adarsh Lozano   YOB: 1970  Date: 11/23/2022   My doctor: Lesa Valero MD   My clinic: [facility]        My Rescue Medicine:   Albuterol inhaler (Proair/Ventolin/Proventil HFA)  2-4 puffs EVERY 4 HOURS as needed. Use a spacer if recommended by your provider.   My Asthma Severity:   Intermittent / Exercise Induced  Know your asthma triggers: Patient is unaware of triggers  None          GREEN ZONE   Good Control    I feel good    No cough or wheeze    Can work, sleep and play without asthma symptoms       Take your asthma control medicine every day.     1. If exercise triggers your asthma, take your rescue medication    15 minutes before exercise or sports, and    During exercise if you have asthma symptoms  2. Spacer to use with inhaler: If you have a spacer, make sure to use it with your inhaler             YELLOW ZONE Getting Worse  I have ANY of these:    I do not feel good    Cough or wheeze    Chest feels tight    Wake up at night   1. Keep taking your Green Zone medications  2. Start taking your rescue medicine:    every 20 minutes for up to 1 hour. Then every 4 hours for 24-48 hours.  3. If you stay in the Yellow Zone for more than 12-24 hours, contact your doctor.  4. If you do not return to the Green Zone in 12-24 hours or you get worse, start taking your oral steroid medicine if prescribed by your provider.           RED ZONE Medical Alert - Get Help  I have ANY of these:    I feel awful    Medicine is not helping    Breathing getting harder    Trouble walking or talking    Nose opens wide to breathe       1. Take your rescue medicine NOW  2. If your provider has prescribed an oral steroid medicine, start taking it NOW  3. Call your doctor NOW  4. If you are still in the Red Zone after 20 minutes and you have not reached your doctor:    Take your rescue medicine again and    Call 911 or go to the emergency room right  away    See your regular doctor within 2 weeks of an Emergency Room or Urgent Care visit for follow-up treatment.          Annual Reminders:  Meet with Asthma Educator,  Flu Shot in the Fall, consider Pneumonia Vaccination for patients with asthma (aged 19 and older).    Pharmacy:    [id number] - [place] - [address] [address] [address] [facility] [address] [facility]  [facility] - A [facility] ORDER [facility]  [id number] - [place], [place] - [address]  [facility] - USE FOR FAXING ONLY - [place]  [facility] - [place], [place] - [facility] [facility]    Electronically signed by Lesa Valero MD   Date: 11/23/22                    Asthma Triggers  How To Control Things That Make Your Asthma Worse    Triggers are things that make your asthma worse.  Look at the list below to help you find your triggers and   what you can do about them. You can help prevent asthma flare-ups by staying away from your triggers.      Trigger                                                          What you can do   Cigarette Smoke  Tobacco smoke can make asthma worse. Do not allow smoking in your home, car or around you.  Be sure no one smokes at a child s day care or school.  If you smoke, ask your health care provider for ways to help you quit.  Ask family members to quit too.  Ask your health care provider for a referral to Quit Plan to help you quit smoking, or call 3-846-613-PLAN.     Colds, Flu, Bronchitis  These are common triggers of asthma. Wash your hands often.  Don t touch your eyes, nose or mouth.  Get a flu shot every year.     Dust Mites  These are tiny bugs that live in cloth or carpet. They are too small to see. Wash sheets and blankets in hot water every week.   Encase pillows and mattress in dust mite proof covers.  Avoid having carpet if you can. If you have carpet, vacuum weekly.   Use a dust mask and HEPA vacuum.   Pollen and Outdoor Mold  Some  people are allergic to trees, grass, or weed pollen, or molds. Try to keep your windows closed.  Limit time out doors when pollen count is high.   Ask you health care provider about taking medicine during allergy season.     Animal Dander  Some people are allergic to skin flakes, urine or saliva from pets with fur or feathers. Keep pets with fur or feathers out of your home.    If you can t keep the pet outdoors, then keep the pet out of your bedroom.  Keep the bedroom door closed.  Keep pets off cloth furniture and away from stuffed toys.     Mice, Rats, and Cockroaches  Some people are allergic to the waste from these pests.   Cover food and garbage.  Clean up spills and food crumbs.  Store grease in the refrigerator.   Keep food out of the bedroom.   Indoor Mold  This can be a trigger if your home has high moisture. Fix leaking faucets, pipes, or other sources of water.   Clean moldy surfaces.  Dehumidify basement if it is damp and smelly.   Smoke, Strong Odors, and Sprays  These can reduce air quality. Stay away from strong odors and sprays, such as perfume, powder, hair spray, paints, smoke incense, paint, cleaning products, candles and new carpet.   Exercise or Sports  Some people with asthma have this trigger. Be active!  Ask your doctor about taking medicine before sports or exercise to prevent symptoms.    Warm up for 5-10 minutes before and after sports or exercise.     Other Triggers of Asthma  Cold air:  Cover your nose and mouth with a scarf.  Sometimes laughing or crying can be a trigger.  Some medicines and food can trigger asthma.

## 2022-11-23 NOTE — PROGRESS NOTES
SUBJECTIVE:   CC: Carrington is an 52 year old who presents for preventative health visit.     Needs a physical.    Needs medications refilled.    Patient has been advised of split billing requirements and indicates understanding: Yes     Healthy Habits:     Getting at least 3 servings of Calcium per day:  Yes    Bi-annual eye exam:  Yes    Dental care twice a year:  Yes    Sleep apnea or symptoms of sleep apnea:  Excessive snoring    Diet:  Regular (no restrictions)    Frequency of exercise:  1 day/week    Duration of exercise:  15-30 minutes    Taking medications regularly:  Yes    Medication side effects:  Lightheadedness    PHQ-2 Total Score: 0    Additional concerns today:  No      Today's PHQ-2 Score:   PHQ-2 ( 1999 [employer]) 11/23/2022   Q1: Little interest or pleasure in doing things 0   Q2: Feeling down, depressed or hopeless 0   PHQ-2 Score 0   PHQ-2 Total Score (12-17 Years)- Positive if 3 or more points; Administer PHQ-A if positive -   Q1: Little interest or pleasure in doing things More than half the days   Q2: Feeling down, depressed or hopeless Several days   PHQ-2 Score 3     PHQ 11/23/2022   PHQ-9 Total Score 0   Q9: Thoughts of better off dead/self-harm past 2 weeks Not at all       Have you ever done Advance Care Planning? (For example, a Health Directive, POLST, or a discussion with a medical provider or your loved ones about your wishes): No, advance care planning information given to patient to review.  Patient declined advance care planning discussion at this time.    Social History     Tobacco Use     Smoking status: Never     Smokeless tobacco: Never   Substance Use Topics     Alcohol use: Yes     Comment: occasionally         Alcohol Use 11/23/2022   Prescreen: >3 drinks/day or >7 drinks/week? No   Prescreen: >3 drinks/day or >7 drinks/week? -       Last PSA: No results found for: PSA    Reviewed orders with patient. Reviewed health maintenance and updated orders accordingly - Yes  Lab work is in  process  Labs reviewed in EPIC  BP Readings from Last 3 Encounters:   11/23/22 124/80   01/17/22 130/70   03/29/21 122/78    Wt Readings from Last 3 Encounters:   11/23/22 91.4 kg (201 lb 8 oz)   01/17/22 88.5 kg (195 lb)   03/29/21 89.4 kg (197 lb)                  Patient Active Problem List   Diagnosis     Intermittent asthma     Hypertension     Personal history of malignant neoplasm of bladder     Past Surgical History:   Procedure Laterality Date     BLADDER SURGERY       CYSTOSCOPY       CYSTOSCOPY, RETROGRADES, COMBINED  3/24/2014    Procedure: COMBINED CYSTOSCOPY, RETROGRADES;;  Surgeon: Aiden Villa MD;  Location:  OR     CYSTOSCOPY, TRANSURETHRAL RESECTION (TUR) TUMOR BLADDER, COMBINED  3/24/2014    Procedure: COMBINED CYSTOSCOPY, TRANSURETHRAL RESECTION (TUR) TUMOR BLADDER;  CYSTOSCOPY, BILATERAL RETROGRADES, TRANSURETHRAL RESECTION BLADDER TUMOR ;  Surgeon: Aiden Villa MD;  Location:  OR       Social History     Tobacco Use     Smoking status: Never     Smokeless tobacco: Never   Substance Use Topics     Alcohol use: Yes     Comment: occasionally     Family History   Problem Relation Age of Onset     No Known Problems Mother      Cardiovascular Father      Heart Disease Father         MI     Myocardial Infarction Father          Current Outpatient Medications   Medication Sig Dispense Refill     albuterol (PROAIR HFA/PROVENTIL HFA/VENTOLIN HFA) 108 (90 Base) MCG/ACT inhaler Inhale 2 puffs into the lungs every 6 hours as needed for shortness of breath / dyspnea or wheezing 8.5 g 3     lisinopril (ZESTRIL) 10 MG tablet Take 1 tablet (10 mg) by mouth daily 90 tablet 3     Allergies   Allergen Reactions     No Known Drug Allergies      Seasonal Allergies      Recent Labs   Lab Test 03/29/21  1428 12/11/17  1410   * 132*   HDL 39*  --    TRIG 257*  --    ALT 55  --    CR 0.93 0.88   GFRESTIMATED >90 >90   GFRESTBLACK >90 >90   POTASSIUM 3.8 3.8        Reviewed and updated as  "needed this visit by clinical staff   Tobacco  Allergies  Meds  Problems  Med Hx  Surg Hx  Fam Hx          Reviewed and updated as needed this visit by Provider   Tobacco  Allergies  Meds  Problems  Med Hx  Surg Hx  Fam Hx         Past Medical History:   Diagnosis Date     Bladder cancer (H) 5/9/2015     Environmental allergies      Hypertension      Uncomplicated asthma       Past Surgical History:   Procedure Laterality Date     BLADDER SURGERY       CYSTOSCOPY       CYSTOSCOPY, RETROGRADES, COMBINED  3/24/2014    Procedure: COMBINED CYSTOSCOPY, RETROGRADES;;  Surgeon: Aiden Villa MD;  Location:  OR     CYSTOSCOPY, TRANSURETHRAL RESECTION (TUR) TUMOR BLADDER, COMBINED  3/24/2014    Procedure: COMBINED CYSTOSCOPY, TRANSURETHRAL RESECTION (TUR) TUMOR BLADDER;  CYSTOSCOPY, BILATERAL RETROGRADES, TRANSURETHRAL RESECTION BLADDER TUMOR ;  Surgeon: Aiden Villa MD;  Location:  OR       Review of Systems   Constitutional: Negative for chills and fever.   HENT: Negative for congestion, ear pain, hearing loss and sore throat.    Eyes: Negative for pain and visual disturbance.   Respiratory: Negative for cough and shortness of breath.    Cardiovascular: Negative for chest pain, palpitations and peripheral edema.   Gastrointestinal: Negative for abdominal pain, constipation, diarrhea, heartburn, hematochezia and nausea.   Genitourinary: Negative for dysuria, frequency, genital sores, hematuria, impotence, penile discharge and urgency.   Musculoskeletal: Negative for arthralgias, joint swelling and myalgias.   Skin: Negative for rash.   Neurological: Negative for dizziness, weakness, headaches and paresthesias.   Psychiatric/Behavioral: Negative for mood changes. The patient is not nervous/anxious.          OBJECTIVE:   /80 (BP Location: Right arm, Patient Position: Sitting, Cuff Size: Adult Large)   Pulse 81   Temp 98.3  F (36.8  C) (Oral)   Resp 16   Ht 1.713 m (5' 7.44\")   " Wt 91.4 kg (201 lb 8 oz)   SpO2 98%   BMI 31.15 kg/m      Physical Exam  GENERAL: healthy, alert and no distress  EYES: Eyes grossly normal to inspection, PERRL and conjunctivae and sclerae normal  HENT: ear canals and TM's normal, nose and mouth without ulcers or lesions  NECK: no adenopathy, no asymmetry, masses, or scars and thyroid normal to palpation  RESP: lungs clear to auscultation - no rales, rhonchi or wheezes  CV: regular rate and rhythm, normal S1 S2, no S3 or S4, no murmur, click or rub, no peripheral edema and peripheral pulses strong  ABDOMEN: soft, nontender, no hepatosplenomegaly, no masses and bowel sounds normal  MS: no gross musculoskeletal defects noted, no edema  SKIN: no suspicious lesions or rashes  NEURO: Normal strength and tone, mentation intact and speech normal  PSYCH: mentation appears normal, affect normal/bright    Diagnostic Test Results:  Labs reviewed in Epic    ASSESSMENT/PLAN:   (Z00.00) Routine general medical examination at a health care facility  (primary encounter diagnosis)  Comment: Exam completed today, routine health maintenance items updated as able.  Labs ordered.  Follow up one year or sooner as needed.  Plan: Lipid panel reflex to direct LDL Fasting,         Hemoglobin A1c, Comprehensive metabolic panel         (BMP + Alb, Alk Phos, ALT, AST, Total. Bili,         TP), CBC with platelets            (J45.20) Mild intermittent asthma without complication  Comment: Refill medication for PRN use.  Plan: albuterol (PROAIR HFA/PROVENTIL HFA/VENTOLIN         HFA) 108 (90 Base) MCG/ACT inhaler, Asthma         Action Plan (AAP)            (I10) Benign hypertension  Comment: Well controlled, continue current medication.  Plan: lisinopril (ZESTRIL) 10 MG tablet            (Z12.11) Screen for colon cancer  Comment: Declined  Plan: Offered colonoscopy, cologuard, and FIT testing. Patient declined all three.    (Z11.4) Screening for HIV (human immunodeficiency virus)  Plan: HIV  "Antigen Antibody Combo            (Z11.59) Need for hepatitis C screening test  Plan: Hepatitis C Screen Reflex to HCV RNA Quant and         Genotype            (Z23) Need for vaccination  Plan: TDAP VACCINE (Adacel, Boostrix)              COUNSELING:   Reviewed preventive health counseling, as reflected in patient instructions       Immunizations    Vaccinated for: TDAP and Declined: Covid-19, Hepatitis B and Zoster due to Other                Colorectal cancer screening      BMI:   Estimated body mass index is 31.15 kg/m  as calculated from the following:    Height as of this encounter: 1.713 m (5' 7.44\").    Weight as of this encounter: 91.4 kg (201 lb 8 oz).   Weight management plan: Discussed healthy diet and exercise guidelines      He reports that he has never smoked. He has never used smokeless tobacco.        Lesa Valero MD  [facility]  Answers for HPI/ROS submitted by the patient on 11/23/2022  If you checked off any problems, how difficult have these problems made it for you to do your work, take care of things at home, or get along with other people?: Not difficult at all  PHQ9 TOTAL SCORE: 0      "

## 2023-01-05 ENCOUNTER — OFFICE VISIT (OUTPATIENT)
Dept: UROLOGY | Facility: CLINIC | Age: 53
End: 2023-01-05
Payer: COMMERCIAL

## 2023-01-05 VITALS
SYSTOLIC BLOOD PRESSURE: 124 MMHG | BODY MASS INDEX: 31.55 KG/M2 | DIASTOLIC BLOOD PRESSURE: 80 MMHG | WEIGHT: 201 LBS | HEIGHT: 67 IN

## 2023-01-05 DIAGNOSIS — C67.9 MALIGNANT NEOPLASM OF URINARY BLADDER, UNSPECIFIED SITE (H): Primary | ICD-10-CM

## 2023-01-05 LAB
ALBUMIN UR-MCNC: NEGATIVE MG/DL
APPEARANCE UR: CLEAR
BILIRUB UR QL STRIP: NEGATIVE
COLOR UR AUTO: YELLOW
GLUCOSE UR STRIP-MCNC: NEGATIVE MG/DL
HGB UR QL STRIP: NEGATIVE
KETONES UR STRIP-MCNC: NEGATIVE MG/DL
LEUKOCYTE ESTERASE UR QL STRIP: NEGATIVE
NITRATE UR QL: NEGATIVE
PH UR STRIP: 5.5 [PH] (ref 5–7)
SP GR UR STRIP: 1.02 (ref 1–1.03)
UROBILINOGEN UR STRIP-ACNC: 0.2 E.U./DL

## 2023-01-05 PROCEDURE — 52000 CYSTOURETHROSCOPY: CPT | Performed by: STUDENT IN AN ORGANIZED HEALTH CARE EDUCATION/TRAINING PROGRAM

## 2023-01-05 PROCEDURE — 99213 OFFICE O/P EST LOW 20 MIN: CPT | Mod: 25 | Performed by: STUDENT IN AN ORGANIZED HEALTH CARE EDUCATION/TRAINING PROGRAM

## 2023-01-05 PROCEDURE — 81003 URINALYSIS AUTO W/O SCOPE: CPT | Mod: QW | Performed by: STUDENT IN AN ORGANIZED HEALTH CARE EDUCATION/TRAINING PROGRAM

## 2023-01-05 RX ORDER — LIDOCAINE HYDROCHLORIDE 20 MG/ML
JELLY TOPICAL ONCE
Status: COMPLETED | OUTPATIENT
Start: 2023-01-05 | End: 2023-01-05

## 2023-01-05 RX ADMIN — LIDOCAINE HYDROCHLORIDE: 20 JELLY TOPICAL at 09:31

## 2023-01-05 ASSESSMENT — PAIN SCALES - GENERAL: PAINLEVEL: NO PAIN (0)

## 2023-01-05 NOTE — NURSING NOTE
Chief Complaint   Patient presents with     Bladder cancer     Cystoscopy     Prior to the start of the procedure and with procedural staff participation, I verbally confirmed the patient s identity using two indicators, relevant allergies, that the procedure was appropriate and matched the consent or emergent situation, and that the correct equipment/implants were available. Immediately prior to starting the procedure I conducted the Time Out with the procedural staff and re-confirmed the patient s name, procedure, and site/side. I have wiped the patient off with the povidone-Iodine solution, draped them, and used Lidocaine hydrochloride jelly. (The Joint Commission universal protocol was followed.)  Yes    Sedation (Moderate or Deep): None    5mL 2% lidocaine hydrochloride Urojet instilled into urethra.    NDC# [address]-0  Lot #: [id number]  Expiration Date:  8/24    Michelle Burnett EMT

## 2023-01-05 NOTE — PATIENT INSTRUCTIONS
"Get a CT scan    I have recommended transurethral of bladder tumor; if you want to schedule this then contact the office. Otherwise we could do a biopsy and fulguration in the office but I don't recommend this    If you want to have a second opinion you should see Dr. Matthew Ghotra        AFTER YOUR CYSTOSCOPY  ?  ?  You have just completed a cystoscopy, or \"cysto\", which allowed your physician to learn more about your bladder (or to remove a stent placed after surgery). We suggest that you continue to avoid caffeine, fruit juice, and alcohol for the next 24 hours, however, you are encouraged to return to your normal activities.  ?  ?  A few things that are considered normal after your cystoscopy:  ?  * small amount of bleeding (or spotting) that clears within the next 24 hours  ?  * slight burning sensation with urination  ?  * sensation of needing to void (urinate) more frequently  ?  * the feeling of \"air\" in your urine  ?  * mild discomfort that is relieved with Tylenol    * bladder spasms  ?  ?  ?  Please contact our office promptly if you:  ?  * develop a fever above 101 degrees  ?  * are unable to urinate  ?  * develop bright red blood that does not stop  ?  * experience severe pain or swelling  ?  ?  ?  And of course, please contact our office with any concerns or questions [phone].  ?    "

## 2023-01-05 NOTE — PROGRESS NOTES
"CHIEF COMPLAINT   Adarsh Lozano who is a 52 year old male returns today for follow-up of bladder cancer (low grade, first diagnosed 2014).      HPI   Adarsh Lozano is a 52 year old male returns today for follow-up of bladder cancer (low grade, first diagnosed 2014) with several recurrences treated with office biopsy and fulguration of tumor, most recent recurrence in 2018     He denies any recent gross hematuria or difficulty with urination    PHYSICAL EXAM  Patient is a 52 year old  male   Vitals: Blood pressure 124/80, height 1.702 m (5' 7\"), weight 91.2 kg (201 lb).  Body mass index is 31.48 kg/m .  General Appearance Adult:   Alert, no acute distress, oriented  HENT: throat/mouth:normal, good dentition  Lungs: no respiratory distress, or pursed lip breathing  Heart: No obvious jugular venous distension present  Abdomen: nondistended  Musculoskeltal: extremities normal, no peripheral edema  Skin: no suspicious lesions or rashes  Neuro: Alert, oriented, speech and mentation normal  Psych: affect and mood normal  Gait: Normal  : circ phallus    No recent imaging      PRE-PROCEDURE DIAGNOSIS: h/o bladder cancer    POST-PROCEDURE DIAGNOSIS: bladder tumor    PROCEDURE: Cystoscopy    DESCRIPTION OF PROCEDURE: After informed consent was obtained, the patient was brought to the procedure room where he was placed in the supine position with all pressure points well padded.  The penis was prepped and draped in sterile fashion. A flexible cystoscope was introduced through a well-lubricated urethra.     Anterior urethra normal  Prostate short and nonobstructive  Mild trabeculation of bladder  Multifocal papillary tumor recurrent on left lateral bladder, several 3-5 mm tumors overlying the prior TURBT scarring from 2014    The flexible cystoscope was removed and the findings were described to the patient.     ASSESSMENT AND PLAN: 52 year old male returns today for follow-up of bladder cancer (low grade, first " diagnosed 2014) with several recurrences treated with office biopsy and fulguration of tumor, most recent recurrence in 2018    Multifocal recurrent papillary tumor over the left lateral bladder. Tumors are small and overlie the prior TURBT scar. Recommend repeat transurethral resection of bladder tumor +/- single dose of intravesical chemothrapy which he is quite reluctant to undergo. He has previously had in office cystoscopy/biopsy/fulguration which is also an option given the history of low grade disease, but my preference would be formal resection of the recurrence as well as the prior scar tissue for complete pathologic staging and hopefully less recurrence risk. Risks including bleeding, bladder perforation, infection, recurrence discussed.     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 After extensive discussion patient wants a second opinion    Should get CT urogram for upper tract surveillance and rule out upper tract tumor    - patient asks for a second opinion. He should be set up with Dr. Ghotra for repeat cystoscopy after CT urogram  - otherwise, he should contact the office to set up TURBT or in office fulguration per his preference  - send urine cytology/FISH to assess for high grade urothelial cell carcinoma    Leo Juárez MD   [facility]  [phone] clinic phone

## 2023-01-10 ENCOUNTER — HOSPITAL ENCOUNTER (OUTPATIENT)
Dept: CT IMAGING | Facility: CLINIC | Age: 53
Discharge: HOME | End: 2023-01-10
Attending: STUDENT IN AN ORGANIZED HEALTH CARE EDUCATION/TRAINING PROGRAM | Admitting: STUDENT IN AN ORGANIZED HEALTH CARE EDUCATION/TRAINING PROGRAM
Payer: COMMERCIAL

## 2023-01-10 DIAGNOSIS — C67.9 MALIGNANT NEOPLASM OF URINARY BLADDER, UNSPECIFIED SITE (H): ICD-10-CM

## 2023-01-10 PROCEDURE — 250N000009 HC RX 250: Performed by: STUDENT IN AN ORGANIZED HEALTH CARE EDUCATION/TRAINING PROGRAM

## 2023-01-10 PROCEDURE — 74178 CT ABD&PLV WO CNTR FLWD CNTR: CPT

## 2023-01-10 PROCEDURE — 250N000011 HC RX IP 250 OP 636: Performed by: STUDENT IN AN ORGANIZED HEALTH CARE EDUCATION/TRAINING PROGRAM

## 2023-01-10 RX ORDER — IOPAMIDOL 755 MG/ML
120 INJECTION, SOLUTION INTRAVASCULAR ONCE
Status: COMPLETED | OUTPATIENT
Start: 2023-01-10 | End: 2023-01-10

## 2023-01-10 RX ADMIN — SODIUM CHLORIDE 100 ML: 9 INJECTION, SOLUTION INTRAVENOUS at 16:30

## 2023-01-10 RX ADMIN — IOPAMIDOL 120 ML: 755 INJECTION, SOLUTION INTRAVENOUS at 16:29

## 2023-01-11 ENCOUNTER — TELEPHONE (OUTPATIENT)
Dept: FAMILY MEDICINE | Facility: CLINIC | Age: 53
End: 2023-01-11

## 2023-01-11 ENCOUNTER — VIRTUAL VISIT (OUTPATIENT)
Dept: FAMILY MEDICINE | Facility: CLINIC | Age: 53
End: 2023-01-11
Payer: COMMERCIAL

## 2023-01-11 DIAGNOSIS — Z85.51 PERSONAL HISTORY OF MALIGNANT NEOPLASM OF BLADDER: ICD-10-CM

## 2023-01-11 DIAGNOSIS — G47.01 INSOMNIA DUE TO MEDICAL CONDITION: Primary | ICD-10-CM

## 2023-01-11 LAB
PATH REPORT.COMMENTS IMP SPEC: NORMAL
PATH REPORT.FINAL DX SPEC: NORMAL
PATH REPORT.GROSS SPEC: NORMAL
PATH REPORT.RELEVANT HX SPEC: NORMAL

## 2023-01-11 PROCEDURE — 99214 OFFICE O/P EST MOD 30 MIN: CPT | Mod: GT | Performed by: NURSE PRACTITIONER

## 2023-01-11 RX ORDER — TRAZODONE HYDROCHLORIDE 50 MG/1
50 TABLET, FILM COATED ORAL AT BEDTIME
Qty: 60 TABLET | Refills: 0 | Status: SHIPPED | OUTPATIENT
Start: 2023-01-11 | End: 2024-01-22

## 2023-01-11 NOTE — LETTER
January 11, 2023      Adarsh Lozano  [address] [address]        To Whom It May Concern:    Adarsh Lozano was seen in our clinic 1/11/2023. He may return to work without restrictions 1/12/23.      Sincerely,        ЮЛИЯ Dickey CNP

## 2023-01-11 NOTE — TELEPHONE ENCOUNTER
Dx w/ recurrent bladder cancer last thurs  Unable to sleep the last 5 days  Calling in sick to work all week- needing work note.     Pt thought Dr. Thang Valeor would just prescribe med and provide letter. Adv appt would be needed. Pt reports plan is to go back to work tomorrow so needing note. Scheduled in [facility] today, Dr. Thang Valero did not have anything until Friday.     Selena REZA RN     Next 5 appointments (look out 90 days)    Jan 11, 2023 11:30 AM  (Arrive by 11:10 AM)  Provider Visit with ЮЛИЯ Dickey CNP  [facility] ([facility] ) [address] [place] [id number] [phone]

## 2023-01-11 NOTE — PROGRESS NOTES
"Carrington is a 52 year old who is being evaluated via a billable video visit.      How would you like to obtain your AVS? MyChart  If the video visit is dropped, the invitation should be resent by: Text to cell phone: [phone]  Will anyone else be joining your video visit? No          Assessment & Plan     Insomnia due to medical condition  Uncontrolled. Discussed and agreed that daytime symptoms of anxiety are low and do not require treatment at this time. Will plan to treat insomnia only.   Start trazodone. Discussed medication use, risks, benefits, and side effects.    Work note created for missed work.   - traZODone (DESYREL) 50 MG tablet  Dispense: 60 tablet; Refill: 0    Personal history of malignant neoplasm of bladder  Current.  Under care of Urology.                    No follow-ups on file.    ЮЛИЯ Dickey [facility]    Roderick Shultz is a 52 year old, presenting for the following health issues:  Insomnia      HPI     Insomnia  Onset/Duration: Few nights  Description:   Frequency of insomnia:  Few nights  Time to fall asleep (sleep latency): 1 hour  Middle of night awakening:  No  Early morning awakening:  YES  Progression of Symptoms:  worsening  Accompanying Signs & Symptoms:  Daytime sleepiness/napping: No  Excessive snoring/apnea: No  Restless legs: No  Waking to urinate: YES  Chronic pain:  No  Depression symptoms (if yes, do PHQ9): No  Anxiety symptoms (if yes, do NIRMALA-7): No  History:  Prior Insomnia: No  New stressful situation: YES bladder cancer is back found out on thursday  Precipitating factors:   Caffeine intake: YES  OTC decongestants: No  Any new medications: No  Alleviating factors:  Self medicating (alcohol, etc.):  No  Stress-reduction (exercise, yoga, meditation etc): No  Therapies tried and outcome: none    Recurrent bladder cancer diagnosis last week.   Feels \"pretty normal during day\"   Feeling restless and anxious at bedtime.   Has missed " "work due to insomnia.   Work schedule is 3am-11am.       Review of Systems   Constitutional, HEENT, cardiovascular, pulmonary, gi and gu systems are negative, except as otherwise noted.      Objective    Vitals - Patient Reported  Weight (Patient Reported): 90.7 kg (200 lb)  Height (Patient Reported): 170.2 cm (5' 7\")  BMI (Based on Pt Reported Ht/Wt): 31.32        Physical Exam   GENERAL: Healthy, alert and no distress  EYES: Eyes grossly normal to inspection.  No discharge or erythema, or obvious scleral/conjunctival abnormalities.  RESP: No audible wheeze, cough, or visible cyanosis.  No visible retractions or increased work of breathing.    SKIN: Visible skin clear. No significant rash, abnormal pigmentation or lesions.  NEURO: Cranial nerves grossly intact.  Mentation and speech appropriate for age.  PSYCH: Mentation appears normal, affect normal/bright, judgement and insight intact, normal speech and appearance well-groomed.                Video-Visit Details    Type of service:  Video Visit     Originating Location (pt. Location): Home    Distant Location (provider location):  Off-site  Platform used for Video Visit: Eugenio"

## 2023-01-12 DIAGNOSIS — Z85.51 PERSONAL HISTORY OF MALIGNANT NEOPLASM OF BLADDER: Primary | ICD-10-CM

## 2023-01-19 ENCOUNTER — PREP FOR PROCEDURE (OUTPATIENT)
Dept: UROLOGY | Facility: CLINIC | Age: 53
End: 2023-01-19
Payer: COMMERCIAL

## 2023-01-19 DIAGNOSIS — C67.9 MALIGNANT NEOPLASM OF URINARY BLADDER, UNSPECIFIED SITE (H): Primary | ICD-10-CM

## 2023-01-20 ENCOUNTER — TELEPHONE (OUTPATIENT)
Dept: UROLOGY | Facility: CLINIC | Age: 53
End: 2023-01-20
Payer: COMMERCIAL

## 2023-01-25 ENCOUNTER — TELEPHONE (OUTPATIENT)
Dept: UROLOGY | Facility: CLINIC | Age: 53
End: 2023-01-25
Payer: COMMERCIAL

## 2023-01-25 NOTE — TELEPHONE ENCOUNTER
----- Message from Leo Juárez MD sent at 1/23/2023  1:16 PM CST -----  I talked to him. He does want office cysto, biopsy and fulguration I guess (not my recommendation but we will book that instead)    Leo Juárez MD   [facility]  [phone] clinic phone    ----- Message -----  From: Carole Batista  Sent: 1/20/2023   2:21 PM CST  To: Leo Juárez MD    I called to scheduled this patient's surgery and he thought he would be getting set up for a bladder biopsy/ fulguration in the office. Can you give him a call to clarify the plan and then let me know?

## 2023-02-07 ENCOUNTER — TELEPHONE (OUTPATIENT)
Dept: FAMILY MEDICINE | Facility: CLINIC | Age: 53
End: 2023-02-07
Payer: COMMERCIAL

## 2023-02-07 NOTE — TELEPHONE ENCOUNTER
Called patient and left voicemail to call back and ask to speak to any triage nurse.      Dear Carrington,      Here are your recent results. Your CT scan did not show any concern for any tumors in the kidneys or ureters which is good news.     Incidentally found was severe hepatic steatosis (fatty liver). You should talk to your primary care physician about this.      Please let us know if you have any questions or concerns.     Regards,  Leo Juárez MD   Written by Leo Juárez MD on 1/12/2023  9:35 AM CST  Seen by patient Carrington Lozano on 1/17/2023  5:42 AM    Charissa Castro RN

## 2023-02-07 NOTE — TELEPHONE ENCOUNTER
Pt calls, informed, aware of fatty liver  Nicki Donovan RN, BSN  [facility]

## 2023-02-22 ENCOUNTER — OFFICE VISIT (OUTPATIENT)
Dept: UROLOGY | Facility: CLINIC | Age: 53
End: 2023-02-22
Payer: COMMERCIAL

## 2023-02-22 VITALS
DIASTOLIC BLOOD PRESSURE: 70 MMHG | HEIGHT: 67 IN | BODY MASS INDEX: 29.03 KG/M2 | WEIGHT: 185 LBS | HEART RATE: 60 BPM | SYSTOLIC BLOOD PRESSURE: 158 MMHG

## 2023-02-22 DIAGNOSIS — C67.9 MALIGNANT NEOPLASM OF URINARY BLADDER, UNSPECIFIED SITE (H): Primary | ICD-10-CM

## 2023-02-22 LAB
ALBUMIN UR-MCNC: NEGATIVE MG/DL
APPEARANCE UR: CLEAR
BILIRUB UR QL STRIP: NEGATIVE
COLOR UR AUTO: YELLOW
GLUCOSE UR STRIP-MCNC: NEGATIVE MG/DL
HGB UR QL STRIP: NEGATIVE
KETONES UR STRIP-MCNC: NEGATIVE MG/DL
LEUKOCYTE ESTERASE UR QL STRIP: NEGATIVE
NITRATE UR QL: NEGATIVE
PH UR STRIP: 6.5 [PH] (ref 5–7)
SP GR UR STRIP: 1.02 (ref 1–1.03)
UROBILINOGEN UR STRIP-ACNC: 0.2 E.U./DL

## 2023-02-22 PROCEDURE — 52204 CYSTOSCOPY W/BIOPSY(S): CPT | Performed by: STUDENT IN AN ORGANIZED HEALTH CARE EDUCATION/TRAINING PROGRAM

## 2023-02-22 PROCEDURE — 81003 URINALYSIS AUTO W/O SCOPE: CPT | Mod: QW | Performed by: STUDENT IN AN ORGANIZED HEALTH CARE EDUCATION/TRAINING PROGRAM

## 2023-02-22 PROCEDURE — 88305 TISSUE EXAM BY PATHOLOGIST: CPT | Performed by: PATHOLOGY

## 2023-02-22 RX ORDER — CIPROFLOXACIN 500 MG/1
500 TABLET, FILM COATED ORAL ONCE
Qty: 1 TABLET | Refills: 0 | Status: SHIPPED | OUTPATIENT
Start: 2023-02-22 | End: 2023-02-22

## 2023-02-22 RX ORDER — LIDOCAINE HYDROCHLORIDE 10 MG/ML
10 INJECTION, SOLUTION INFILTRATION; PERINEURAL ONCE
Status: COMPLETED | OUTPATIENT
Start: 2023-02-22 | End: 2023-02-22

## 2023-02-22 RX ORDER — LIDOCAINE HYDROCHLORIDE 20 MG/ML
JELLY TOPICAL ONCE
Status: COMPLETED | OUTPATIENT
Start: 2023-02-22 | End: 2023-02-22

## 2023-02-22 RX ADMIN — LIDOCAINE HYDROCHLORIDE 20 ML: 20 JELLY TOPICAL at 08:15

## 2023-02-22 RX ADMIN — LIDOCAINE HYDROCHLORIDE 10 ML: 10 INJECTION, SOLUTION INFILTRATION; PERINEURAL at 08:15

## 2023-02-22 ASSESSMENT — PAIN SCALES - GENERAL: PAINLEVEL: NO PAIN (0)

## 2023-02-22 NOTE — PROGRESS NOTES
PRE-PROCEDURE DIAGNOSIS: bladder cancer    POST-PROCEDURE DIAGNOSIS: bladder cancer    PROCEDURE: Cystoscopy with bladder biopsy and fulguration    HISTORY: 52 year old male returns today for follow-up of bladder cancer (low grade, first diagnosed 2014) with several recurrences treated with office biopsy and fulguration of tumor, most recent recurrence in 2018, now with concern for recurrence on office cysto 1/5/2023. I offered OR for TURBT vs. Office cysto and biopsy/fulguration, he wishes to proceed with office based procedure      DESCRIPTION OF PROCEDURE: After informed consent was obtained, the patient was brought to the procedure room where he was placed in the supine position with all pressure points well padded.  The penis was prepped and draped in sterile fashion. A flexible cystoscope was introduced through a well-lubricated urethra.     A small area of about 7 mm covered with papillary tumor was seen on the left lateral bladder overlying a prior TURBT scar. This was biopsied with a cold cup forceps and then thoroughly fulgurated with a bugbee    The flexible cystoscope was removed and the findings were described to the patient.     ASSESSMENT AND PLAN: 52 year old male returns today for follow-up of bladder cancer (low grade, first diagnosed 2014) with several recurrences treated with office biopsy and fulguration of tumor, most recent recurrence in 2018, now with recurrent tumor     Biopsy and fulguration successful    cipro 500 mg once    Return 3 months for surveillance cysto assuming the path is low grade    Leo Juárez MD   [facility]  [phone] clinic phone

## 2023-02-22 NOTE — LETTER
2/22/2023       RE: Adarsh Lozano  [address] [address]     Dear Colleague,    Thank you for referring your patient, Adarsh Lozano, to the [facility]. Please see a copy of my visit note below.    PRE-PROCEDURE DIAGNOSIS: bladder cancer    POST-PROCEDURE DIAGNOSIS: bladder cancer    PROCEDURE: Cystoscopy with bladder biopsy and fulguration    HISTORY: 52 year old male returns today for follow-up of bladder cancer (low grade, first diagnosed 2014) with several recurrences treated with office biopsy and fulguration of tumor, most recent recurrence in 2018, now with concern for recurrence on office cysto 1/5/2023. I offered OR for TURBT vs. Office cysto and biopsy/fulguration, he wishes to proceed with office based procedure      DESCRIPTION OF PROCEDURE: After informed consent was obtained, the patient was brought to the procedure room where he was placed in the supine position with all pressure points well padded.  The penis was prepped and draped in sterile fashion. A flexible cystoscope was introduced through a well-lubricated urethra.     A small area of about 7 mm covered with papillary tumor was seen on the left lateral bladder overlying a prior TURBT scar. This was biopsied with a cold cup forceps and then thoroughly fulgurated with a bugbee    The flexible cystoscope was removed and the findings were described to the patient.     ASSESSMENT AND PLAN: 52 year old male returns today for follow-up of bladder cancer (low grade, first diagnosed 2014) with several recurrences treated with office biopsy and fulguration of tumor, most recent recurrence in 2018, now with recurrent tumor     Biopsy and fulguration successful    cipro 500 mg once    Return 3 months for surveillance cysto assuming the path is low grade    Leo Juárez MD   [facility]  [phone] clinic phone

## 2023-02-22 NOTE — NURSING NOTE
The following medication was given:     MEDICATION:  Lidocaine 1% Soln  ROUTE: Local Instillation  SITE: Bladder  DOSE: 100mg/10ml  LOT #: [id number]  : Hospira  EXPIRATION DATE: 09/01/2023  NDC#: 1552-5984-27  Was there drug waste? Yes  Amount of drug waste (mL): 40.  Reason for waste:  As per MD  Multi-dose vial: Yes      The following medication was given:     MEDICATION:  Sodium bicarbonate 8.4% Soln  ROUTE:Local Instillation   SITE: Bladder  DOSE: 40meq/40ml  LOT #: [id number]  : orangutrans   EXPIRATION DATE: 06/2024  NDC#: [address]-5  Was there drug waste? Yes, 10ml  Multi-dose vial: Yes    Prior to procedure patient's urethra was cleansed with iodine and draped in sterile fashion. Lidocaine Gel was inserted into urethra. Patient tolerated well.   2% Lidocaine Hydrochloride Jelly Lot#[id number]    Exp: 01/2024    Alison Zhang LPN

## 2023-02-22 NOTE — PATIENT INSTRUCTIONS
"      ?            AFTER YOUR CYSTOSCOPY         You have just completed a cystoscopy, or \"cysto\", which allowed your physician to learn more about your bladder (or to remove a stent placed after surgery). We suggest that you continue to avoid caffeine, fruit juice, chocolate, and alcohol for the next 24 hours; However, you are encouraged to return to your normal activities.       A few things that are considered normal after your cystoscopy:    * small amount of bleeding (or spotting) that clears within the next 24 hours    * Slight burning sensation with urination    * Sensation of needing to avoid more frequently    * The feeling of \"air\" in your urine or peeing out bubbles    * Mild discomfort that is relieved with Tylenol    *Bladder Spasms         Please contact our office promptly if you:    * develop a fever above 101 degrees    * are unable to urinate    * develop bright red blood that does not stop    * severe pain or swelling      And of course, please contact our office with any concerns or questions [phone] 8:00am-4:30pm after hours please call [phone].  ?    "

## 2023-02-22 NOTE — NURSING NOTE
Chief Complaint   Patient presents with     malignant neoplasm of urinary bladder     Patient is here for Cystoscopy and Bladder Biopsy/Fulguration      Prior to the start of the procedure and with procedural staff participation, I verbally confirmed the patient s identity using two indicators, relevant allergies, that the procedure was appropriate and matched the consent or emergent situation, and that the correct equipment/implants were available. Immediately prior to starting the procedure I conducted the Time Out with the procedural staff and re-confirmed the patient s name, procedure, and site/side. (The Joint Commission universal protocol was followed.)  Yes    Sedation (Moderate or Deep): None    Alison Zhang LPN

## 2023-02-24 LAB
PATH REPORT.COMMENTS IMP SPEC: NORMAL
PATH REPORT.COMMENTS IMP SPEC: NORMAL
PATH REPORT.FINAL DX SPEC: NORMAL
PATH REPORT.GROSS SPEC: NORMAL
PATH REPORT.MICROSCOPIC SPEC OTHER STN: NORMAL
PATH REPORT.RELEVANT HX SPEC: NORMAL
PHOTO IMAGE: NORMAL

## 2023-04-12 ENCOUNTER — VIRTUAL VISIT (OUTPATIENT)
Dept: FAMILY MEDICINE | Facility: CLINIC | Age: 53
End: 2023-04-12
Payer: COMMERCIAL

## 2023-04-12 DIAGNOSIS — J01.90 ACUTE SINUSITIS WITH SYMPTOMS > 10 DAYS: Primary | ICD-10-CM

## 2023-04-12 DIAGNOSIS — K08.89 PAIN, DENTAL: ICD-10-CM

## 2023-04-12 PROCEDURE — 99213 OFFICE O/P EST LOW 20 MIN: CPT | Mod: VID | Performed by: PHYSICIAN ASSISTANT

## 2023-04-12 RX ORDER — AMOXICILLIN 500 MG/1
500 CAPSULE ORAL 2 TIMES DAILY
Qty: 20 CAPSULE | Refills: 0 | Status: SHIPPED | OUTPATIENT
Start: 2023-04-12 | End: 2023-04-22

## 2023-04-12 NOTE — PATIENT INSTRUCTIONS
At [facility], we strive to deliver an exceptional experience to you, every time we see you. If you receive a survey, please complete it as we do value your feedback.  If you have MyChart, you can expect to receive results automatically within 24 hours of their completion.  Your provider will send a note interpreting your results as well.   If you do not have MyChart, you should receive your results in about a week by mail.    Your care team:                            Family Medicine Internal Medicine   MD Jordan Adhikari MD Shantel Branch-Fleming, MD Srinivasa Vaka, MD Katya Belousova, PAЮЛИЯ Hollis CNP, MD ([facility]   Morgan Pisano, MD Lore Evans MD Amelia Massimini APRN FELICIANO Vargas APRN MD Say Lane MD          Clinic hours: Monday - Thursday 7 am-6 pm; Fridays 7 am-5 pm.   Urgent care: Monday - Friday 10 am- 8 pm; Saturday and Sunday 9 am-5 pm.    Clinic: [phone]       [facility]: Monday - Thursday 8 am - 7 pm; Friday 8 am - 6 pm  [facility]: [phone]

## 2023-05-22 ENCOUNTER — OFFICE VISIT (OUTPATIENT)
Dept: UROLOGY | Facility: CLINIC | Age: 53
End: 2023-05-22
Payer: COMMERCIAL

## 2023-05-22 VITALS
BODY MASS INDEX: 27.47 KG/M2 | DIASTOLIC BLOOD PRESSURE: 85 MMHG | WEIGHT: 175 LBS | OXYGEN SATURATION: 98 % | SYSTOLIC BLOOD PRESSURE: 146 MMHG | HEART RATE: 88 BPM | HEIGHT: 67 IN

## 2023-05-22 DIAGNOSIS — C67.9 MALIGNANT NEOPLASM OF URINARY BLADDER, UNSPECIFIED SITE (H): Primary | ICD-10-CM

## 2023-05-22 LAB
ALBUMIN UR-MCNC: NEGATIVE MG/DL
APPEARANCE UR: CLEAR
BILIRUB UR QL STRIP: NEGATIVE
COLOR UR AUTO: YELLOW
GLUCOSE UR STRIP-MCNC: NEGATIVE MG/DL
HGB UR QL STRIP: NEGATIVE
KETONES UR STRIP-MCNC: NEGATIVE MG/DL
LEUKOCYTE ESTERASE UR QL STRIP: NEGATIVE
NITRATE UR QL: NEGATIVE
PH UR STRIP: 6 [PH] (ref 5–7)
SP GR UR STRIP: >=1.03 (ref 1–1.03)
UROBILINOGEN UR STRIP-ACNC: 0.2 E.U./DL

## 2023-05-22 PROCEDURE — 52000 CYSTOURETHROSCOPY: CPT | Performed by: STUDENT IN AN ORGANIZED HEALTH CARE EDUCATION/TRAINING PROGRAM

## 2023-05-22 PROCEDURE — 99207 PR NO CHARGE LOS: CPT | Mod: 25 | Performed by: STUDENT IN AN ORGANIZED HEALTH CARE EDUCATION/TRAINING PROGRAM

## 2023-05-22 PROCEDURE — 81003 URINALYSIS AUTO W/O SCOPE: CPT | Mod: QW | Performed by: STUDENT IN AN ORGANIZED HEALTH CARE EDUCATION/TRAINING PROGRAM

## 2023-05-22 RX ORDER — LIDOCAINE HYDROCHLORIDE 20 MG/ML
JELLY TOPICAL ONCE
Status: COMPLETED | OUTPATIENT
Start: 2023-05-22 | End: 2023-05-22

## 2023-05-22 RX ADMIN — LIDOCAINE HYDROCHLORIDE 5 ML: 20 JELLY TOPICAL at 15:55

## 2023-05-22 ASSESSMENT — PAIN SCALES - GENERAL: PAINLEVEL: NO PAIN (0)

## 2023-05-22 NOTE — NURSING NOTE
Chief Complaint   Patient presents with     history of bladder cancer     In office cystoscopy   Prior to the start of the procedure and with procedural staff participation, I verbally confirmed the patient s identity using two indicators, relevant allergies, that the procedure was appropriate and matched the consent or emergent situation, and that the correct equipment/implants were available. Immediately prior to starting the procedure I conducted the Time Out with the procedural staff and re-confirmed the patient s name, procedure, and site/side. I have wiped the patient off with the povidone-Iodine solution, draped them,  used Lidocaine hydrochloride jelly, and instilled sterile water into the bladder. (The Joint Commission universal protocol was followed.)  Yes    Sedation (Moderate or Deep): None  5mL 2% lidocaine hydrochloride Urojet instilled into urethra.    NDC# [address]-9  Lot #: [id number]  Expiration Date:  10-24  Nancy Dietrich LPN

## 2023-05-22 NOTE — LETTER
"5/22/2023       RE: Adrash Lozano  [address] [address] [facility] [address]     Dear Colleague,    Thank you for referring your patient, Adarsh Lozano, to the [facility] [place] at [facility]. Please see a copy of my visit note below.    CHIEF COMPLAINT   Adarsh Lozano who is a 52 year old male returns today for follow-up of h/o bladder cancer.      HPI   Adarsh Lozano is a 52 year old male returns today for follow-up of bladder cancer (low grade, first diagnosed 2014) with several recurrences treated with office biopsy and fulguration of tumor, most recent recurrence in 2018, with concern for recurrence on office cysto 1/5/2023, office biopsy with benign findings 2/22/2023    Has not had any further issues in the past few months. Denies gross hematuria      PHYSICAL EXAM  Patient is a 52 year old  male   Vitals: Blood pressure (!) 146/85, pulse 88, height 1.702 m (5' 7\"), weight 79.4 kg (175 lb), SpO2 98 %.  Body mass index is 27.41 kg/m .  General Appearance Adult:   Alert, no acute distress, oriented  HENT: throat/mouth:normal, good dentition  Lungs: no respiratory distress, or pursed lip breathing  Heart: No obvious jugular venous distension present  Abdomen: nondistended  Musculoskeltal: extremities normal, no peripheral edema  Skin: no suspicious lesions or rashes  Neuro: Alert, oriented, speech and mentation normal  Psych: affect and mood normal  Gait: Normal  : circ phallus      surg path 2/22/2023  A(A).  Urinary bladder Biopsy :  -Minute fragments of primarily denuded superficial urothelium, negative for atypia or malignancy  -Lamina propria with no invasive malignancy identified  -No muscularis propria is present       PRE-PROCEDURE DIAGNOSIS: h/o bladder cancer    POST-PROCEDURE DIAGNOSIS: h/o bladder cancer    PROCEDURE: Cystoscopy     DESCRIPTION OF PROCEDURE: After informed consent was obtained, the patient was brought " to the procedure room where he was placed in the supine position with all pressure points well padded.  The penis was prepped and draped in sterile fashion. A flexible cystoscope was introduced through a well-lubricated urethra.    Anterior urethra normal  Prostatic urethra short and nonobstructive  Prior TURBT scar on left lateral bladder noted, no evidence of any papillary tumor, there is a tiny area of polypoid inflammation on it    The flexible cystoscope was removed and the findings were described to the patient.     ASSESSMENT AND PLAN: 52 year old male returns today for follow-up of bladder cancer (low grade, first diagnosed 2014) with several recurrences treated with office biopsy and fulguration of tumor, most recent recurrence in 2018, with concern for recurrence on office cysto 1/5/2023, office biopsy with benign findings 2/22/2023    Reviewed path from last time with patient. No malignancy    No obvious recurrence today, small amount of polypoid inflammation overlying prior biopsy site. Will have patient return 6 months for surveillance cysto    - follow up 6 months for surveillance cysto    Leo Juárez MD   [facility]  [phone] clinic phone

## 2023-05-22 NOTE — NURSING NOTE
Chief Complaint   Patient presents with     history of bladder cancer     In office cystoscopy   Rogelio Montalvo, clinic assistant

## 2023-05-22 NOTE — PROGRESS NOTES
"CHIEF COMPLAINT   Adarsh Lozano who is a 52 year old male returns today for follow-up of h/o bladder cancer.      HPI   Adarsh Lozano is a 52 year old male returns today for follow-up of bladder cancer (low grade, first diagnosed 2014) with several recurrences treated with office biopsy and fulguration of tumor, most recent recurrence in 2018, with concern for recurrence on office cysto 1/5/2023, office biopsy with benign findings 2/22/2023    Has not had any further issues in the past few months. Denies gross hematuria      PHYSICAL EXAM  Patient is a 52 year old  male   Vitals: Blood pressure (!) 146/85, pulse 88, height 1.702 m (5' 7\"), weight 79.4 kg (175 lb), SpO2 98 %.  Body mass index is 27.41 kg/m .  General Appearance Adult:   Alert, no acute distress, oriented  HENT: throat/mouth:normal, good dentition  Lungs: no respiratory distress, or pursed lip breathing  Heart: No obvious jugular venous distension present  Abdomen: nondistended  Musculoskeltal: extremities normal, no peripheral edema  Skin: no suspicious lesions or rashes  Neuro: Alert, oriented, speech and mentation normal  Psych: affect and mood normal  Gait: Normal  : circ phallus      surg path 2/22/2023  A(A).  Urinary bladder Biopsy :  -Minute fragments of primarily denuded superficial urothelium, negative for atypia or malignancy  -Lamina propria with no invasive malignancy identified  -No muscularis propria is present       PRE-PROCEDURE DIAGNOSIS: h/o bladder cancer    POST-PROCEDURE DIAGNOSIS: h/o bladder cancer    PROCEDURE: Cystoscopy     DESCRIPTION OF PROCEDURE: After informed consent was obtained, the patient was brought to the procedure room where he was placed in the supine position with all pressure points well padded.  The penis was prepped and draped in sterile fashion. A flexible cystoscope was introduced through a well-lubricated urethra.    Anterior urethra normal  Prostatic urethra short and nonobstructive  Prior TURBT " scar on left lateral bladder noted, no evidence of any papillary tumor, there is a tiny area of polypoid inflammation on it    The flexible cystoscope was removed and the findings were described to the patient.     ASSESSMENT AND PLAN: 52 year old male returns today for follow-up of bladder cancer (low grade, first diagnosed 2014) with several recurrences treated with office biopsy and fulguration of tumor, most recent recurrence in 2018, with concern for recurrence on office cysto 1/5/2023, office biopsy with benign findings 2/22/2023    Reviewed path from last time with patient. No malignancy    No obvious recurrence today, small amount of polypoid inflammation overlying prior biopsy site. Will have patient return 6 months for surveillance cysto    - follow up 6 months for surveillance cysto    Leo Juárez MD   [facility]  [phone] clinic phone

## 2023-05-22 NOTE — PATIENT INSTRUCTIONS

## 2023-06-26 ENCOUNTER — TELEPHONE (OUTPATIENT)
Dept: FAMILY MEDICINE | Facility: CLINIC | Age: 53
End: 2023-06-26

## 2023-06-26 NOTE — TELEPHONE ENCOUNTER
Patient Quality Outreach    Patient is due for the following:   Hypertension -  BP check    Next Steps:   Schedule a nurse only visit for BP check    Type of outreach:    Sent Rebel[employer] message.    Next Steps:  Reach out within 90 days via Letter.    Max number of attempts reached: No. Will try again in 90 days if patient still on fail list.    Questions for provider review:    None           Alfonso Mota, CMA

## 2023-10-24 ENCOUNTER — PATIENT OUTREACH (OUTPATIENT)
Dept: CARE COORDINATION | Facility: CLINIC | Age: 53
End: 2023-10-24
Payer: COMMERCIAL

## 2023-11-07 ENCOUNTER — PATIENT OUTREACH (OUTPATIENT)
Dept: CARE COORDINATION | Facility: CLINIC | Age: 53
End: 2023-11-07
Payer: COMMERCIAL

## 2023-11-27 ENCOUNTER — OFFICE VISIT (OUTPATIENT)
Dept: UROLOGY | Facility: CLINIC | Age: 53
End: 2023-11-27
Payer: COMMERCIAL

## 2023-11-27 VITALS
DIASTOLIC BLOOD PRESSURE: 100 MMHG | SYSTOLIC BLOOD PRESSURE: 160 MMHG | HEART RATE: 78 BPM | OXYGEN SATURATION: 99 % | BODY MASS INDEX: 28.25 KG/M2 | WEIGHT: 180 LBS | HEIGHT: 67 IN

## 2023-11-27 DIAGNOSIS — C67.9 MALIGNANT NEOPLASM OF URINARY BLADDER, UNSPECIFIED SITE (H): Primary | ICD-10-CM

## 2023-11-27 LAB
ALBUMIN UR-MCNC: NEGATIVE MG/DL
APPEARANCE UR: CLEAR
BILIRUB UR QL STRIP: NEGATIVE
COLOR UR AUTO: YELLOW
GLUCOSE UR STRIP-MCNC: NEGATIVE MG/DL
HGB UR QL STRIP: NEGATIVE
KETONES UR STRIP-MCNC: NEGATIVE MG/DL
LEUKOCYTE ESTERASE UR QL STRIP: NEGATIVE
NITRATE UR QL: NEGATIVE
PH UR STRIP: 7 [PH] (ref 5–7)
SP GR UR STRIP: 1.01 (ref 1–1.03)
UROBILINOGEN UR STRIP-ACNC: 0.2 E.U./DL

## 2023-11-27 PROCEDURE — 81003 URINALYSIS AUTO W/O SCOPE: CPT | Mod: QW | Performed by: STUDENT IN AN ORGANIZED HEALTH CARE EDUCATION/TRAINING PROGRAM

## 2023-11-27 PROCEDURE — 52000 CYSTOURETHROSCOPY: CPT | Performed by: STUDENT IN AN ORGANIZED HEALTH CARE EDUCATION/TRAINING PROGRAM

## 2023-11-27 PROCEDURE — 99214 OFFICE O/P EST MOD 30 MIN: CPT | Mod: 25 | Performed by: STUDENT IN AN ORGANIZED HEALTH CARE EDUCATION/TRAINING PROGRAM

## 2023-11-27 RX ORDER — LIDOCAINE HYDROCHLORIDE 20 MG/ML
JELLY TOPICAL ONCE
Status: COMPLETED | OUTPATIENT
Start: 2023-11-27 | End: 2023-11-27

## 2023-11-27 RX ADMIN — LIDOCAINE HYDROCHLORIDE 5 ML: 20 JELLY TOPICAL at 15:53

## 2023-11-27 ASSESSMENT — PAIN SCALES - GENERAL: PAINLEVEL: NO PAIN (0)

## 2023-11-27 NOTE — PATIENT INSTRUCTIONS
"Patient will decide on cystoscopy and transurethral resection of bladder tumor vs. Office cystoscopy, biopsy and fulguration. He will contact the office        AFTER YOUR CYSTOSCOPY  ?  ?  You have just completed a cystoscopy, or \"cysto\", which allowed your physician to learn more about your bladder (or to remove a stent placed after surgery). We suggest that you continue to avoid caffeine, fruit juice, and alcohol for the next 24 hours, however, you are encouraged to return to your normal activities.  ?  ?  A few things that are considered normal after your cystoscopy:  ?  * small amount of bleeding (or spotting) that clears within the next 24 hours  ?  * slight burning sensation with urination  ?  * sensation of needing to void (urinate) more frequently  ?  * the feeling of \"air\" in your urine  ?  * mild discomfort that is relieved with Tylenol    * bladder spasms  ?  ?  ?  Please contact our office promptly if you:  ?  * develop a fever above 101 degrees  ?  * are unable to urinate  ?  * develop bright red blood that does not stop  ?  * experience severe pain or swelling  ?  ?  ?  And of course, please contact our office with any concerns or questions [phone].  ?   "

## 2023-11-27 NOTE — NURSING NOTE
Chief Complaint   Patient presents with    BLADDER CANCER     HERE IN CLINIC FOR A CYSTOSCOPY WITH DR NYE    Prior to the start of the procedure DR NYE and with procedural staff participation, I verbally confirmed the patient s identity using two indicators, relevant allergies, that the procedure was appropriate and matched the consent or emergent situation, and that the correct equipment/implants were available. Immediately prior to starting the procedure I conducted the Time Out with the procedural staff and re-confirmed the patient s name, procedure, and site/side. I have wiped the patient off with the povidone-Iodine solution, draped them,  used Lidocaine hydrochloride jelly, and instilled sterile water into the bladder. (The Joint Commission universal protocol was followed.)  Yes    5mL 2% lidocaine hydrochloride Urojet instilled into urethra.    NDC# [address]-0  Lot #: [id number]  Expiration Date:  04-25

## 2023-11-27 NOTE — PROGRESS NOTES
"CHIEF COMPLAINT   Adarsh Lozano who is a 53 year old male returns today for follow-up of low grade bladder cancer.      HPI   Adarsh Lozano is a 53 year old male returns today for follow-up of bladder cancer (low grade, first diagnosed 2014) with several recurrences treated with office biopsy and fulguration of tumor, most recent recurrence in 2018, with concern for recurrence on office cysto 1/5/2023, office biopsy with benign findings 2/22/2023     PHYSICAL EXAM  Patient is a 53 year old  male   Vitals: Blood pressure (!) 160/100, pulse 78, height 1.702 m (5' 7\"), weight 81.6 kg (180 lb), SpO2 99%.  Body mass index is 28.19 kg/m .  General Appearance Adult:   Alert, no acute distress, oriented  HENT: throat/mouth:normal, good dentition  Lungs: no respiratory distress, or pursed lip breathing  Heart: No obvious jugular venous distension present  Abdomen: nondistended  Musculoskeltal: extremities normal, no peripheral edema  Skin: no suspicious lesions or rashes  Neuro: Alert, oriented, speech and mentation normal  Psych: affect and mood normal  Gait: Normal      PRE-PROCEDURE DIAGNOSIS: bladder cancer    POST-PROCEDURE DIAGNOSIS: bladder cancer    PROCEDURE: Cystoscopy     DESCRIPTION OF PROCEDURE: After informed consent was obtained, the patient was brought to the procedure room where he was placed in the supine position with all pressure points well padded.  The penis was prepped and draped in sterile fashion. A flexible cystoscope was introduced through a well-lubricated urethra.    Overlying the TURBT scar on left lateral bladder there appears to be low grade papillary tumor recurrence, about 1 cm in total diameter    The flexible cystoscope was removed and the findings were described to the patient.       ASSESSMENT and PLAN  53 year old male returns today for follow-up of bladder cancer (low grade, first diagnosed 2014) with several recurrences treated with office biopsy and fulguration of tumor, most " recent recurrence in 2018, with concern for recurrence on office cysto 1/5/2023, office biopsy with benign findings 2/22/2023     Recommend cystoscopy with formal transurethral resection of bladder tumor. Risks including bladder perforation, bleeding, infection, tumor recurrence discussed. Would plan for catheter for at least a few days.    He is very reluctant to this plan. In particular, he is a  and does a lot of lifting    With repeat office biopsy and fulguration the recurrence risk is higher than operative management under anesthesia    I had long discussion with him and his wife and he will contact the office regarding his decision.    Over 30 minutes spent on this encounter not including the time spent performing the cystoscopy    Leo Juárez MD   [facility]  [facility] Phone: [phone]

## 2024-01-22 ENCOUNTER — OFFICE VISIT (OUTPATIENT)
Dept: UROLOGY | Facility: CLINIC | Age: 54
End: 2024-01-22
Payer: COMMERCIAL

## 2024-01-22 VITALS
WEIGHT: 179 LBS | DIASTOLIC BLOOD PRESSURE: 80 MMHG | BODY MASS INDEX: 28.09 KG/M2 | HEART RATE: 73 BPM | OXYGEN SATURATION: 93 % | SYSTOLIC BLOOD PRESSURE: 142 MMHG | HEIGHT: 67 IN

## 2024-01-22 DIAGNOSIS — Z79.2 PROPHYLACTIC ANTIBIOTIC: ICD-10-CM

## 2024-01-22 DIAGNOSIS — C67.9 MALIGNANT NEOPLASM OF URINARY BLADDER, UNSPECIFIED SITE (H): Primary | ICD-10-CM

## 2024-01-22 PROCEDURE — 88305 TISSUE EXAM BY PATHOLOGIST: CPT | Performed by: PATHOLOGY

## 2024-01-22 PROCEDURE — 52204 CYSTOSCOPY W/BIOPSY(S): CPT | Performed by: STUDENT IN AN ORGANIZED HEALTH CARE EDUCATION/TRAINING PROGRAM

## 2024-01-22 PROCEDURE — 81003 URINALYSIS AUTO W/O SCOPE: CPT | Mod: QW | Performed by: STUDENT IN AN ORGANIZED HEALTH CARE EDUCATION/TRAINING PROGRAM

## 2024-01-22 RX ORDER — LIDOCAINE HYDROCHLORIDE 20 MG/ML
JELLY TOPICAL ONCE
Status: COMPLETED | OUTPATIENT
Start: 2024-01-22 | End: 2024-01-22

## 2024-01-22 RX ORDER — SULFAMETHOXAZOLE/TRIMETHOPRIM 800-160 MG
1 TABLET ORAL ONCE
Qty: 1 TABLET | Refills: 0 | Status: SHIPPED | OUTPATIENT
Start: 2024-01-22 | End: 2024-01-22

## 2024-01-22 RX ORDER — LIDOCAINE HYDROCHLORIDE 10 MG/ML
10 INJECTION, SOLUTION EPIDURAL; INFILTRATION; INTRACAUDAL; PERINEURAL ONCE
Status: COMPLETED | OUTPATIENT
Start: 2024-01-22 | End: 2024-01-22

## 2024-01-22 RX ADMIN — LIDOCAINE HYDROCHLORIDE 5 ML: 20 JELLY TOPICAL at 15:08

## 2024-01-22 RX ADMIN — LIDOCAINE HYDROCHLORIDE 10 ML: 10 INJECTION, SOLUTION EPIDURAL; INFILTRATION; INTRACAUDAL; PERINEURAL at 16:22

## 2024-01-22 ASSESSMENT — PAIN SCALES - GENERAL: PAINLEVEL: NO PAIN (0)

## 2024-01-22 NOTE — NURSING NOTE
Chief Complaint   Patient presents with    Bladder cancer (H)     In office cystoscopy.    Prior to the start of the procedure and with procedural staff participation, I verbally confirmed the patient s identity using two indicators, relevant allergies, that the procedure was appropriate and matched the consent or emergent situation, and that the correct equipment/implants were available. Immediately prior to starting the procedure I conducted the Time Out with the procedural staff and re-confirmed the patient s name, procedure, and site/side. I have wiped the patient off with the povidone-Iodine solution, draped them,  used Lidocaine hydrochloride jelly, and instilled sterile water into the bladder. (The Joint Commission universal protocol was followed.)  Yes    Sedation (Moderate or Deep): None   5mL 2% lidocaine hydrochloride Urojet instilled into urethra.    NDC# [address]-2  Lot #: [id number]  Expiration Date:  4-25    Patient had a bladder biopsy with fulguration today.  The following medication was given:     MEDICATION:  Lidocaine 1% Soln  ROUTE:  intravesicle  SITE: bladder  DOSE: 10 ml  LOT #: [id number]  : Hospira  EXPIRATION DATE: 01/02/2025  NDC#: 5833-2608-61   Was there drug waste? Yes  Amount of drug waste (mL): 10 ml.  Reason for waste:  As per MD  Multi-dose vial: Yes    The following medication was given:     MEDICATION:  Sodium bicarbonate 8.4% Soln  ROUTE:  intravesicle  SITE: bladder  DOSE: 40 ml  LOT #: [id number]  : Exela  EXPIRATION DATE: 06/2024  NDC#: [address]-0   Was there drug waste? Yes  Amount of drug waste (mL): 10ml.  Reason for waste:  As per MD  Multi-dose vial: Yes    Nancy Dietrich LPN  January 22, 2024

## 2024-01-22 NOTE — LETTER
1/22/2024       RE: Adarsh Lozano  [address] [facility] [address]     Dear Colleague,    Thank you for referring your patient, Adarsh Lozano, to the [facility]OLOGY [facility] [place] at [facility]. Please see a copy of my visit note below.    PRE-PROCEDURE DIAGNOSIS: ***    POST-PROCEDURE DIAGNOSIS: ***    PROCEDURE: Cystoscopy with *** ureteral stent removal    HISTORY: *** year old male with ***    REVIEW OF OFFICE STUDIES (e.g., uroflow or PVR): ***    DESCRIPTION OF PROCEDURE: After informed consent was obtained, the patient was brought to the procedure room where he was placed in the supine position with all pressure points well padded.  The penis was prepped and draped in sterile fashion. A flexible cystoscope was introduced through a well-lubricated urethra. The stent was visualized, grasped with a flexible grasper and removed intact and discarded    The flexible cystoscope was removed and the findings were described to the patient.     ASSESSMENT AND PLAN: *** year old male with ***    - follow up in 4-6 weeks with *** RBUS and Litholink prior    Leo Juárez MD   [facility]  [phone] clinic phone      Again, thank you for allowing me to participate in the care of your patient.      Sincerely,    Leo Juárez MD

## 2024-01-22 NOTE — PROGRESS NOTES
PRE-PROCEDURE DIAGNOSIS: h/o bladder cancer    POST-PROCEDURE DIAGNOSIS: h/o bladder cancer    PROCEDURE: Cystoscopy with bladder biopsy and fulguration    HISTORY: 53 year old male returns today for follow-up of bladder cancer (low grade, first diagnosed 2014) with several recurrences treated with office biopsy and fulguration of tumor, most recent recurrence in 2018, with concern for recurrence on office cysto 1/5/2023, office biopsy with benign findings 2/22/2023     DESCRIPTION OF PROCEDURE: After informed consent was obtained, the patient was brought to the procedure room where he was placed in the supine position with all pressure points well padded.  The penis was prepped and draped in sterile fashion. A flexible cystoscope was introduced through a well-lubricated urethra. The suspicious area on the left lateral bladder was biopsied with cup biopsy forceps and then fulgurated with bugbee    The flexible cystoscope was removed and the findings were described to the patient.     ASSESSMENT AND PLAN: 53 year old male returns today for follow-up of bladder cancer (low grade, first diagnosed 2014) with several recurrences treated with office biopsy and fulguration of tumor, most recent recurrence in 2018, with concern for recurrence on office cysto 1/5/2023, office biopsy with benign findings 2/22/2023     Repeat biopsy today, will follow up pending path results    Leo Juárez MD   [facility]  [phone] clinic phone

## 2024-01-22 NOTE — PATIENT INSTRUCTIONS

## 2024-02-04 ENCOUNTER — HEALTH MAINTENANCE LETTER (OUTPATIENT)
Age: 54
End: 2024-02-04

## 2024-04-16 NOTE — LETTER
"1/5/2023       RE: Adarsh Lozano  [address] [facility] [address]     Dear Colleague,    Thank you for referring your patient, Adarsh Lozano, to the [facility] UROLOGY CLINIC [place] at [facility]. Please see a copy of my visit note below.    CHIEF COMPLAINT   Adarsh Lozano who is a 52 year old male returns today for follow-up of bladder cancer (low grade, first diagnosed 2014).      HPI   Adarsh Lozano is a 52 year old male returns today for follow-up of bladder cancer (low grade, first diagnosed 2014) with several recurrences treated with office biopsy and fulguration of tumor, most recent recurrence in 2018     He denies any recent gross hematuria or difficulty with urination    PHYSICAL EXAM  Patient is a 52 year old  male   Vitals: Blood pressure 124/80, height 1.702 m (5' 7\"), weight 91.2 kg (201 lb).  Body mass index is 31.48 kg/m .  General Appearance Adult:   Alert, no acute distress, oriented  HENT: throat/mouth:normal, good dentition  Lungs: no respiratory distress, or pursed lip breathing  Heart: No obvious jugular venous distension present  Abdomen: nondistended  Musculoskeltal: extremities normal, no peripheral edema  Skin: no suspicious lesions or rashes  Neuro: Alert, oriented, speech and mentation normal  Psych: affect and mood normal  Gait: Normal  : circ phallus    No recent imaging      PRE-PROCEDURE DIAGNOSIS: h/o bladder cancer    POST-PROCEDURE DIAGNOSIS: bladder tumor    PROCEDURE: Cystoscopy    DESCRIPTION OF PROCEDURE: After informed consent was obtained, the patient was brought to the procedure room where he was placed in the supine position with all pressure points well padded.  The penis was prepped and draped in sterile fashion. A flexible cystoscope was introduced through a well-lubricated urethra.     Anterior urethra normal  Prostate short and nonobstructive  Mild trabeculation of bladder  Multifocal " papillary tumor recurrent on left lateral bladder, several 3-5 mm tumors overlying the prior TURBT scarring from 2014    The flexible cystoscope was removed and the findings were described to the patient.     ASSESSMENT AND PLAN: 52 year old male returns today for follow-up of bladder cancer (low grade, first diagnosed 2014) with several recurrences treated with office biopsy and fulguration of tumor, most recent recurrence in 2018    Multifocal recurrent papillary tumor over the left lateral bladder. Tumors are small and overlie the prior TURBT scar. Recommend repeat transurethral resection of bladder tumor +/- single dose of intravesical chemothrapy which he is quite reluctant to undergo. He has previously had in office cystoscopy/biopsy/fulguration which is also an option given the history of low grade disease, but my preference would be formal resection of the recurrence as well as the prior scar tissue for complete pathologic staging and hopefully less recurrence risk. Risks including bleeding, bladder perforation, infection, recurrence discussed.     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 After extensive discussion patient wants a second opinion    Should get CT urogram for upper tract surveillance and rule out upper tract tumor    - patient asks for a second opinion. He should be set up with Dr. Ghotra for repeat cystoscopy after CT urogram  - otherwise, he should contact the office to set up TURBT or in office fulguration per his preference  - send urine cytology/FISH to assess for high grade urothelial cell carcinoma    Leo Juárez MD   [facility]rology  [phone] clinic phone       No

## 2024-04-26 ENCOUNTER — OFFICE VISIT (OUTPATIENT)
Dept: UROLOGY | Facility: CLINIC | Age: 54
End: 2024-04-26
Payer: COMMERCIAL

## 2024-04-26 VITALS
SYSTOLIC BLOOD PRESSURE: 142 MMHG | BODY MASS INDEX: 28.09 KG/M2 | HEIGHT: 67 IN | DIASTOLIC BLOOD PRESSURE: 78 MMHG | WEIGHT: 179 LBS

## 2024-04-26 DIAGNOSIS — C67.9 MALIGNANT NEOPLASM OF URINARY BLADDER, UNSPECIFIED SITE (H): Primary | ICD-10-CM

## 2024-04-26 PROCEDURE — 99213 OFFICE O/P EST LOW 20 MIN: CPT | Mod: 25 | Performed by: STUDENT IN AN ORGANIZED HEALTH CARE EDUCATION/TRAINING PROGRAM

## 2024-04-26 PROCEDURE — 52000 CYSTOURETHROSCOPY: CPT | Performed by: STUDENT IN AN ORGANIZED HEALTH CARE EDUCATION/TRAINING PROGRAM

## 2024-04-26 PROCEDURE — 81003 URINALYSIS AUTO W/O SCOPE: CPT | Mod: QW | Performed by: STUDENT IN AN ORGANIZED HEALTH CARE EDUCATION/TRAINING PROGRAM

## 2024-04-26 RX ORDER — LIDOCAINE HYDROCHLORIDE 20 MG/ML
JELLY TOPICAL ONCE
Status: COMPLETED | OUTPATIENT
Start: 2024-04-26 | End: 2024-04-26

## 2024-04-26 RX ADMIN — LIDOCAINE HYDROCHLORIDE 5 ML: 20 JELLY TOPICAL at 13:50

## 2024-04-26 ASSESSMENT — PAIN SCALES - GENERAL: PAINLEVEL: NO PAIN (0)

## 2024-04-26 NOTE — PATIENT INSTRUCTIONS

## 2024-04-26 NOTE — NURSING NOTE
Chief Complaint   Patient presents with    Bladder cancer     Pt here for in office cystoscopy        Prior to the start of the procedure and with procedural staff participation, I verbally confirmed the patient s identity using two indicators, relevant allergies, that the procedure was appropriate and matched the consent or emergent situation, and that the correct equipment/implants were available. Immediately prior to starting the procedure I conducted the Time Out with the procedural staff and re-confirmed the patient s name, procedure, and site/side. I have wiped the patient off with the povidone-Iodine solution, draped them,  used Lidocaine hydrochloride jelly, and instilled sterile water into the bladder. (The Joint Commission universal protocol was followed.)  Yes    Sedation (Moderate or Deep): None     5mL 2% lidocaine hydrochloride Urojet instilled into urethra.    NDC# [address]-3  Lot #: [id number]  Expiration Date:  12/25    Vidhya Bateman CMA

## 2024-04-26 NOTE — LETTER
"4/26/2024       RE: Adarsh Lozano  [address] [facility] [address]     Dear Colleague,    Thank you for referring your patient, Adarsh Lozano, to the [facility] UROLOGY CLINIC [place] at [facility]. Please see a copy of my visit note below.    CHIEF COMPLAINT   Adarsh Lozano who is a 53 year old male returns today for follow-up of bladder cancer.      HPI   Adarsh Lozano is a 53 year old male returns today for follow-up of bladder cancer (low grade, first diagnosed 2014) with several recurrences treated with office biopsy and fulguration of tumor, most recent recurrence in 2018, with concern for recurrence on office cysto 1/5/2023, office biopsy with benign findings 2/22/2023, office biopsy with benign findings 1/22/2024    Denies any issues since biopsy    PHYSICAL EXAM  Patient is a 53 year old  male   Vitals: Blood pressure (!) 142/78, height 1.702 m (5' 7\"), weight 81.2 kg (179 lb).  Body mass index is 28.04 kg/m .  General Appearance Adult:   Alert, no acute distress, oriented  HENT: throat/mouth:normal, good dentition  Lungs: no respiratory distress, or pursed lip breathing  Heart: No obvious jugular venous distension present  Abdomen: nondistended  Musculoskeltal: extremities normal, no peripheral edema  Skin: no suspicious lesions or rashes  Neuro: Alert, oriented, speech and mentation normal  Psych: affect and mood normal  Gait: Normal    Bladder biopsy 1/22/2024  Bladder, not otherwise specified, biopsy-  Nonneoplastic bladder mucosa, no evidence of malignancy    Component      Latest Ref Rng 4/26/2024  1:43 PM   Color Urine      Colorless, Straw, Light Yellow, Yellow  Yellow    Appearance Urine      Clear  Clear    Glucose Urine      Negative mg/dL Negative    Bilirubin Urine      Negative  Negative    Ketones Urine      Negative mg/dL Negative    Specific Gravity Urine      1.003 - 1.035  1.015    Blood Urine      Negative  " Negative    pH Urine      5.0 - 7.0  7.0    Protein Albumin Urine      Negative mg/dL Negative    Urobilinogen Urine      0.2, 1.0 E.U./dL 0.2    Nitrite Urine      Negative  Negative    Leukocyte Esterase Urine      Negative  Negative          PRE-PROCEDURE DIAGNOSIS: bladder cancer    POST-PROCEDURE DIAGNOSIS: bladder cancer    PROCEDURE: Cystoscopy     DESCRIPTION OF PROCEDURE: After informed consent was obtained, the patient was brought to the procedure room where he was placed in the supine position with all pressure points well padded.  The penis was prepped and draped in sterile fashion. A flexible cystoscope was introduced through a well-lubricated urethra.     Bladder unremarkable without any concern for papillary tumor or raised erythema. Prior biopsy site healed well    The flexible cystoscope was removed and the findings were described to the patient.     ASSESSMENT AND PLAN: 53 year old male returns today for follow-up of bladder cancer (low grade, first diagnosed 2014) with several recurrences treated with office biopsy and fulguration of tumor, most recent recurrence in 2018, with concern for recurrence on office cysto 1/5/2023, office biopsy with benign findings 2/22/2023, office biopsy with benign findings 1/22/2024      No concern for recurrence today    Follow up 1 year for surveillance cysto    Leo Juárez MD   [facility] Urology  [phone] clinic phone      Reviewed two unique results (bladder biopsy, UA)

## 2024-04-26 NOTE — PROGRESS NOTES
"CHIEF COMPLAINT   Adarsh Lozano who is a 53 year old male returns today for follow-up of bladder cancer.      HPI   Adarsh Lozano is a 53 year old male returns today for follow-up of bladder cancer (low grade, first diagnosed 2014) with several recurrences treated with office biopsy and fulguration of tumor, most recent recurrence in 2018, with concern for recurrence on office cysto 1/5/2023, office biopsy with benign findings 2/22/2023, office biopsy with benign findings 1/22/2024    Denies any issues since biopsy    PHYSICAL EXAM  Patient is a 53 year old  male   Vitals: Blood pressure (!) 142/78, height 1.702 m (5' 7\"), weight 81.2 kg (179 lb).  Body mass index is 28.04 kg/m .  General Appearance Adult:   Alert, no acute distress, oriented  HENT: throat/mouth:normal, good dentition  Lungs: no respiratory distress, or pursed lip breathing  Heart: No obvious jugular venous distension present  Abdomen: nondistended  Musculoskeltal: extremities normal, no peripheral edema  Skin: no suspicious lesions or rashes  Neuro: Alert, oriented, speech and mentation normal  Psych: affect and mood normal  Gait: Normal    Bladder biopsy 1/22/2024  Bladder, not otherwise specified, biopsy-  Nonneoplastic bladder mucosa, no evidence of malignancy    Component      Latest Ref Rng 4/26/2024  1:43 PM   Color Urine      Colorless, Straw, Light Yellow, Yellow  Yellow    Appearance Urine      Clear  Clear    Glucose Urine      Negative mg/dL Negative    Bilirubin Urine      Negative  Negative    Ketones Urine      Negative mg/dL Negative    Specific Gravity Urine      1.003 - 1.035  1.015    Blood Urine      Negative  Negative    pH Urine      5.0 - 7.0  7.0    Protein Albumin Urine      Negative mg/dL Negative    Urobilinogen Urine      0.2, 1.0 E.U./dL 0.2    Nitrite Urine      Negative  Negative    Leukocyte Esterase Urine      Negative  Negative          PRE-PROCEDURE DIAGNOSIS: bladder cancer    POST-PROCEDURE DIAGNOSIS: " bladder cancer    PROCEDURE: Cystoscopy     DESCRIPTION OF PROCEDURE: After informed consent was obtained, the patient was brought to the procedure room where he was placed in the supine position with all pressure points well padded.  The penis was prepped and draped in sterile fashion. A flexible cystoscope was introduced through a well-lubricated urethra.     Bladder unremarkable without any concern for papillary tumor or raised erythema. Prior biopsy site healed well    The flexible cystoscope was removed and the findings were described to the patient.     ASSESSMENT AND PLAN: 53 year old male returns today for follow-up of bladder cancer (low grade, first diagnosed 2014) with several recurrences treated with office biopsy and fulguration of tumor, most recent recurrence in 2018, with concern for recurrence on office cysto 1/5/2023, office biopsy with benign findings 2/22/2023, office biopsy with benign findings 1/22/2024      No concern for recurrence today    Follow up 1 year for surveillance cysto    Leo Juárez MD   [facility] Urology  [phone] clinic phone      Reviewed two unique results (bladder biopsy, UA)

## 2024-04-29 ENCOUNTER — MYC REFILL (OUTPATIENT)
Dept: FAMILY MEDICINE | Facility: CLINIC | Age: 54
End: 2024-04-29
Payer: COMMERCIAL

## 2024-04-29 DIAGNOSIS — I10 BENIGN HYPERTENSION: ICD-10-CM

## 2024-05-03 RX ORDER — LISINOPRIL 10 MG/1
10 TABLET ORAL DAILY
Qty: 90 TABLET | Refills: 0 | Status: SHIPPED | OUTPATIENT
Start: 2024-05-03

## 2024-05-03 NOTE — TELEPHONE ENCOUNTER
Patient has not been seen for physical or in person office visit for a year. Please let patient know that I have sent over a 3 month supply of the lisinopril but there will be no refills on this so he is going to need to be scheduled for a preventative visit for additional refills.     Thanks!  Kiana Allan PA-C  (Covering provider for Dr. Lesa Martinez)

## 2024-05-21 ENCOUNTER — PATIENT OUTREACH (OUTPATIENT)
Dept: CARE COORDINATION | Facility: CLINIC | Age: 54
End: 2024-05-21
Payer: COMMERCIAL

## 2024-05-22 ENCOUNTER — TELEPHONE (OUTPATIENT)
Dept: FAMILY MEDICINE | Facility: CLINIC | Age: 54
End: 2024-05-22
Payer: COMMERCIAL

## 2024-05-22 NOTE — TELEPHONE ENCOUNTER
Patient Quality Outreach    Patient is due for the following:   Asthma  -  ACT needed  Hypertension -  BP check    Next Steps:   Schedule a nurse only visit for BP check    Type of outreach:    Sent [employer] message.      Questions for provider review:    None           Arleth Collins, CMA

## 2025-03-02 ENCOUNTER — HEALTH MAINTENANCE LETTER (OUTPATIENT)
Age: 55
End: 2025-03-02

## 2025-03-12 DIAGNOSIS — I10 BENIGN HYPERTENSION: ICD-10-CM

## 2025-03-12 RX ORDER — LISINOPRIL 10 MG/1
10 TABLET ORAL DAILY
Qty: 90 TABLET | Refills: 3 | OUTPATIENT
Start: 2025-03-12

## 2025-05-09 ENCOUNTER — OFFICE VISIT (OUTPATIENT)
Dept: UROLOGY | Facility: CLINIC | Age: 55
End: 2025-05-09
Payer: COMMERCIAL

## 2025-05-09 VITALS
BODY MASS INDEX: 26.68 KG/M2 | SYSTOLIC BLOOD PRESSURE: 132 MMHG | WEIGHT: 170 LBS | DIASTOLIC BLOOD PRESSURE: 88 MMHG | HEIGHT: 67 IN

## 2025-05-09 DIAGNOSIS — C67.9 MALIGNANT NEOPLASM OF URINARY BLADDER, UNSPECIFIED SITE (H): Primary | ICD-10-CM

## 2025-05-09 LAB
ALBUMIN UR-MCNC: NEGATIVE MG/DL
APPEARANCE UR: CLEAR
BILIRUB UR QL STRIP: NEGATIVE
COLOR UR AUTO: YELLOW
GLUCOSE UR STRIP-MCNC: NEGATIVE MG/DL
HGB UR QL STRIP: NEGATIVE
KETONES UR STRIP-MCNC: 40 MG/DL
LEUKOCYTE ESTERASE UR QL STRIP: NEGATIVE
NITRATE UR QL: NEGATIVE
PH UR STRIP: 7 [PH] (ref 5–7)
SP GR UR STRIP: 1.02 (ref 1–1.03)
UROBILINOGEN UR STRIP-ACNC: 1 E.U./DL

## 2025-05-09 PROCEDURE — 52000 CYSTOURETHROSCOPY: CPT | Performed by: STUDENT IN AN ORGANIZED HEALTH CARE EDUCATION/TRAINING PROGRAM

## 2025-05-09 PROCEDURE — 3079F DIAST BP 80-89 MM HG: CPT | Performed by: STUDENT IN AN ORGANIZED HEALTH CARE EDUCATION/TRAINING PROGRAM

## 2025-05-09 PROCEDURE — 3075F SYST BP GE 130 - 139MM HG: CPT | Performed by: STUDENT IN AN ORGANIZED HEALTH CARE EDUCATION/TRAINING PROGRAM

## 2025-05-09 PROCEDURE — 99213 OFFICE O/P EST LOW 20 MIN: CPT | Mod: 25 | Performed by: STUDENT IN AN ORGANIZED HEALTH CARE EDUCATION/TRAINING PROGRAM

## 2025-05-09 PROCEDURE — 1126F AMNT PAIN NOTED NONE PRSNT: CPT | Performed by: STUDENT IN AN ORGANIZED HEALTH CARE EDUCATION/TRAINING PROGRAM

## 2025-05-09 PROCEDURE — 81003 URINALYSIS AUTO W/O SCOPE: CPT | Mod: QW | Performed by: STUDENT IN AN ORGANIZED HEALTH CARE EDUCATION/TRAINING PROGRAM

## 2025-05-09 RX ORDER — LIDOCAINE HYDROCHLORIDE 20 MG/ML
JELLY TOPICAL ONCE
Status: COMPLETED | OUTPATIENT
Start: 2025-05-09 | End: 2025-05-09

## 2025-05-09 RX ADMIN — LIDOCAINE HYDROCHLORIDE 5 ML: 20 JELLY TOPICAL at 15:30

## 2025-05-09 ASSESSMENT — PAIN SCALES - GENERAL: PAINLEVEL_OUTOF10: NO PAIN (0)

## 2025-05-09 NOTE — NURSING NOTE
Chief Complaint   Patient presents with    Bladder cancer     Pt here for in office cystoscopy      Prior to the start of the procedure and with procedural staff participation, I verbally confirmed the patient s identity using two indicators, relevant allergies, that the procedure was appropriate and matched the consent or emergent situation, and that the correct equipment/implants were available. Immediately prior to starting the procedure I conducted the Time Out with the procedural staff and re-confirmed the patient s name, procedure, and site/side. I have wiped the patient off with the povidone-Iodine solution, draped them,  used Lidocaine hydrochloride jelly, and instilled sterile water into the bladder. (The Joint Commission universal protocol was followed.)  Yes    Sedation (Moderate or Deep): None     5mL 2% lidocaine hydrochloride Urojet instilled into urethra.    NDC# [address]-2  Lot #: [id number]  Expiration date:  01/27    Vidhya Bateman CMA

## 2025-05-09 NOTE — PATIENT INSTRUCTIONS

## 2025-05-09 NOTE — PROGRESS NOTES
"CHIEF COMPLAINT   Adarsh Lozano who is a 54 year old male returns today for follow-up of bladder cancer (low grade, first diagnosed 2014) with several recurrences treated with office biopsy and fulguration of tumor, most recent recurrence in 2018, office biopsies with benign findings 2/22/2023 and 1/22/2024     HPI   Adarsh Lozano is a 54 year old male returns today for follow-up of bladder cancer (low grade, first diagnosed 2014) with several recurrences treated with office biopsy and fulguration of tumor, most recent recurrence in 2018, office biopsies with benign findings 2/22/2023 and 1/22/2024     Denies any gross hematuria or other urinary issues      PHYSICAL EXAM  Patient is a 54 year old  male   Vitals: Blood pressure 132/88, height 1.702 m (5' 7\"), weight 77.1 kg (170 lb).  Body mass index is 26.63 kg/m .  General Appearance Adult:   Alert, no acute distress, oriented  HENT: throat/mouth:normal, good dentition  Lungs: no respiratory distress, or pursed lip breathing  Heart: No obvious jugular venous distension present  Abdomen: nondistended  Musculoskeltal: extremities normal, no peripheral edema  Skin: no suspicious lesions or rashes  Neuro: Alert, oriented, speech and mentation normal  Psych: affect and mood normal  Gait: Normal  : circ phallus      PRE-PROCEDURE DIAGNOSIS: h/o bladder cancer    POST-PROCEDURE DIAGNOSIS: h/o bladder cancer    PROCEDURE: Cystoscopy     DESCRIPTION OF PROCEDURE: After informed consent was obtained, the patient was brought to the procedure room where he was placed in the supine position with all pressure points well padded.  The penis was prepped and draped in sterile fashion. A flexible cystoscope was introduced through a well-lubricated urethra.     Anterior urethra normal  Prostatic urethra short and nonobstructive  Several well healed prior biopsy/turbt sites  Mild to moderate trabeculation without cellules or diverticuli  No bladder tumor or raised " erythema    The flexible cystoscope was removed and the findings were described to the patient.     Component      Latest Ref Rng 5/9/2025  3:25 PM   Color Urine      Colorless, Straw, Light Yellow, Yellow  Yellow    Appearance Urine      Clear  Clear    Glucose Urine      Negative mg/dL Negative    Bilirubin Urine      Negative  Negative    Ketones Urine      Negative mg/dL 40 !    Specific Gravity Urine      1.003 - 1.035  1.020    Blood Urine      Negative  Negative    pH Urine      5.0 - 7.0  7.0    Protein Albumin Urine      Negative mg/dL Negative    Urobilinogen Urine      0.2, 1.0 E.U./dL 1.0    Nitrite Urine      Negative  Negative    Leukocyte Esterase Urine      Negative  Negative       Legend:  ! Abnormal    ASSESSMENT and PLAN  54 year old male returns today for follow-up of bladder cancer (low grade, first diagnosed 2014) with several recurrences treated with office biopsy and fulguration of tumor, most recent recurrence in 2018, office biopsies with benign findings 2/22/2023 and 1/22/2024       No recurrence today. Urine unremarkable aside from some ketones, no intervention for this    Follow up 1 year for surveillance cysto    Leo Juárez MD   [facility]rology  [facility] Phone: [phone]

## 2025-05-09 NOTE — LETTER
"5/9/2025       RE: Adarsh Lozano  [address] [facility] [address]     Dear Colleague,    Thank you for referring your patient, Adarsh Lozano, to the [facility] UROLOGY CLINIC [place] at [facility]. Please see a copy of my visit note below.    CHIEF COMPLAINT   Adarsh Lozano who is a 54 year old male returns today for follow-up of bladder cancer (low grade, first diagnosed 2014) with several recurrences treated with office biopsy and fulguration of tumor, most recent recurrence in 2018, office biopsies with benign findings 2/22/2023 and 1/22/2024     HPI   Adarsh Lozano is a 54 year old male returns today for follow-up of bladder cancer (low grade, first diagnosed 2014) with several recurrences treated with office biopsy and fulguration of tumor, most recent recurrence in 2018, office biopsies with benign findings 2/22/2023 and 1/22/2024     Denies any gross hematuria or other urinary issues      PHYSICAL EXAM  Patient is a 54 year old  male   Vitals: Blood pressure 132/88, height 1.702 m (5' 7\"), weight 77.1 kg (170 lb).  Body mass index is 26.63 kg/m .  General Appearance Adult:   Alert, no acute distress, oriented  HENT: throat/mouth:normal, good dentition  Lungs: no respiratory distress, or pursed lip breathing  Heart: No obvious jugular venous distension present  Abdomen: nondistended  Musculoskeltal: extremities normal, no peripheral edema  Skin: no suspicious lesions or rashes  Neuro: Alert, oriented, speech and mentation normal  Psych: affect and mood normal  Gait: Normal  : circ phallus      PRE-PROCEDURE DIAGNOSIS: h/o bladder cancer    POST-PROCEDURE DIAGNOSIS: h/o bladder cancer    PROCEDURE: Cystoscopy     DESCRIPTION OF PROCEDURE: After informed consent was obtained, the patient was brought to the procedure room where he was placed in the supine position with all pressure points well padded.  The penis was prepped and " draped in sterile fashion. A flexible cystoscope was introduced through a well-lubricated urethra.     Anterior urethra normal  Prostatic urethra short and nonobstructive  Several well healed prior biopsy/turbt sites  Mild to moderate trabeculation without cellules or diverticuli  No bladder tumor or raised erythema    The flexible cystoscope was removed and the findings were described to the patient.     Component      Latest Ref Rng 5/9/2025  3:25 PM   Color Urine      Colorless, Straw, Light Yellow, Yellow  Yellow    Appearance Urine      Clear  Clear    Glucose Urine      Negative mg/dL Negative    Bilirubin Urine      Negative  Negative    Ketones Urine      Negative mg/dL 40 !    Specific Gravity Urine      1.003 - 1.035  1.020    Blood Urine      Negative  Negative    pH Urine      5.0 - 7.0  7.0    Protein Albumin Urine      Negative mg/dL Negative    Urobilinogen Urine      0.2, 1.0 E.U./dL 1.0    Nitrite Urine      Negative  Negative    Leukocyte Esterase Urine      Negative  Negative       Legend:  ! Abnormal    ASSESSMENT and PLAN  54 year old male returns today for follow-up of bladder cancer (low grade, first diagnosed 2014) with several recurrences treated with office biopsy and fulguration of tumor, most recent recurrence in 2018, office biopsies with benign findings 2/22/2023 and 1/22/2024       No recurrence today. Urine unremarkable aside from some ketones, no intervention for this    Follow up 1 year for surveillance cysto    Leo Juárez MD   [facility]rology  [facility] Phone: [phone]      Again, thank you for allowing me to participate in the care of your patient.      Sincerely,    Leo Juárez MD

## 2025-05-16 SDOH — HEALTH STABILITY: PHYSICAL HEALTH: ON AVERAGE, HOW MANY DAYS PER WEEK DO YOU ENGAGE IN MODERATE TO STRENUOUS EXERCISE (LIKE A BRISK WALK)?: 5 DAYS

## 2025-05-16 SDOH — HEALTH STABILITY: PHYSICAL HEALTH: ON AVERAGE, HOW MANY MINUTES DO YOU ENGAGE IN EXERCISE AT THIS LEVEL?: 20 MIN

## 2025-05-16 ASSESSMENT — ASTHMA QUESTIONNAIRES
QUESTION_3 LAST FOUR WEEKS HOW OFTEN DID YOUR ASTHMA SYMPTOMS (WHEEZING, COUGHING, SHORTNESS OF BREATH, CHEST TIGHTNESS OR PAIN) WAKE YOU UP AT NIGHT OR EARLIER THAN USUAL IN THE MORNING: NOT AT ALL
QUESTION_2 LAST FOUR WEEKS HOW OFTEN HAVE YOU HAD SHORTNESS OF BREATH: NOT AT ALL
ACT_TOTALSCORE: 25
QUESTION_4 LAST FOUR WEEKS HOW OFTEN HAVE YOU USED YOUR RESCUE INHALER OR NEBULIZER MEDICATION (SUCH AS ALBUTEROL): NOT AT ALL
QUESTION_1 LAST FOUR WEEKS HOW MUCH OF THE TIME DID YOUR ASTHMA KEEP YOU FROM GETTING AS MUCH DONE AT WORK, SCHOOL OR AT HOME: NONE OF THE TIME
QUESTION_5 LAST FOUR WEEKS HOW WOULD YOU RATE YOUR ASTHMA CONTROL: COMPLETELY CONTROLLED

## 2025-05-16 ASSESSMENT — SOCIAL DETERMINANTS OF HEALTH (SDOH): HOW OFTEN DO YOU GET TOGETHER WITH FRIENDS OR RELATIVES?: PATIENT DECLINED

## 2025-05-19 ENCOUNTER — OFFICE VISIT (OUTPATIENT)
Dept: FAMILY MEDICINE | Facility: CLINIC | Age: 55
End: 2025-05-19
Payer: COMMERCIAL

## 2025-05-19 VITALS
TEMPERATURE: 98.4 F | HEIGHT: 67 IN | BODY MASS INDEX: 27.62 KG/M2 | RESPIRATION RATE: 18 BRPM | OXYGEN SATURATION: 98 % | SYSTOLIC BLOOD PRESSURE: 142 MMHG | HEART RATE: 106 BPM | WEIGHT: 176 LBS | DIASTOLIC BLOOD PRESSURE: 84 MMHG

## 2025-05-19 DIAGNOSIS — Z12.11 SCREEN FOR COLON CANCER: ICD-10-CM

## 2025-05-19 DIAGNOSIS — Z13.220 SCREENING FOR HYPERLIPIDEMIA: ICD-10-CM

## 2025-05-19 DIAGNOSIS — I10 PRIMARY HYPERTENSION: ICD-10-CM

## 2025-05-19 DIAGNOSIS — I10 BENIGN HYPERTENSION: ICD-10-CM

## 2025-05-19 DIAGNOSIS — Z00.00 ROUTINE GENERAL MEDICAL EXAMINATION AT A HEALTH CARE FACILITY: Primary | ICD-10-CM

## 2025-05-19 LAB
ANION GAP SERPL CALCULATED.3IONS-SCNC: 14 MMOL/L (ref 7–15)
BUN SERPL-MCNC: 16.6 MG/DL (ref 6–20)
CALCIUM SERPL-MCNC: 10 MG/DL (ref 8.8–10.4)
CHLORIDE SERPL-SCNC: 104 MMOL/L (ref 98–107)
CHOLEST SERPL-MCNC: 209 MG/DL
CREAT SERPL-MCNC: 1.08 MG/DL (ref 0.67–1.17)
EGFRCR SERPLBLD CKD-EPI 2021: 83 ML/MIN/1.73M2
FASTING STATUS PATIENT QL REPORTED: NO
FASTING STATUS PATIENT QL REPORTED: NO
GLUCOSE SERPL-MCNC: 91 MG/DL (ref 70–99)
HCO3 SERPL-SCNC: 23 MMOL/L (ref 22–29)
HDLC SERPL-MCNC: 43 MG/DL
LDLC SERPL CALC-MCNC: 104 MG/DL
NONHDLC SERPL-MCNC: 166 MG/DL
POTASSIUM SERPL-SCNC: 3.6 MMOL/L (ref 3.4–5.3)
SODIUM SERPL-SCNC: 141 MMOL/L (ref 135–145)
TRIGL SERPL-MCNC: 311 MG/DL

## 2025-05-19 PROCEDURE — 80061 LIPID PANEL: CPT

## 2025-05-19 PROCEDURE — 3079F DIAST BP 80-89 MM HG: CPT

## 2025-05-19 PROCEDURE — 80048 BASIC METABOLIC PNL TOTAL CA: CPT

## 2025-05-19 PROCEDURE — 3077F SYST BP >= 140 MM HG: CPT

## 2025-05-19 PROCEDURE — 99396 PREV VISIT EST AGE 40-64: CPT

## 2025-05-19 PROCEDURE — 99213 OFFICE O/P EST LOW 20 MIN: CPT | Mod: 25

## 2025-05-19 PROCEDURE — 36415 COLL VENOUS BLD VENIPUNCTURE: CPT

## 2025-05-19 RX ORDER — LISINOPRIL 10 MG/1
10 TABLET ORAL DAILY
Qty: 90 TABLET | Refills: 0 | Status: SHIPPED | OUTPATIENT
Start: 2025-05-19

## 2025-05-19 NOTE — PROGRESS NOTES
"Preventive Care Visit  [facility]  ЮЛИЯ Espinosa CNP, Nurse Practitioner Primary Care  May 19, 2025      Assessment & Plan     Routine general medical examination at a health care facility  Health maintenance updated     Benign hypertension  Primary hypertension  Restart lisinopril. Recommend follow up visit with labs in one month since he is restarting after having been off for many months  - lisinopril (ZESTRIL) 10 MG tablet; Take 1 tablet (10 mg) by mouth daily.  - BASIC METABOLIC PANEL; Future  - BASIC METABOLIC PANEL    Screen for colon cancer  - Colonoscopy Screening  Referral; Future    Screening for hyperlipidemia  - Lipid panel reflex to direct LDL Non-fasting; Future  - Lipid panel reflex to direct LDL Non-fasting            BMI  Estimated body mass index is 27.57 kg/m  as calculated from the following:    Height as of this encounter: 1.702 m (5' 7\").    Weight as of this encounter: 79.8 kg (176 lb).   Weight management plan: Discussed healthy diet and exercise guidelines    Counseling  Appropriate preventive services were addressed with this patient via screening, questionnaire, or discussion as appropriate for fall prevention, nutrition, physical activity, Tobacco-use cessation, social engagement, weight loss and cognition.  Checklist reviewing preventive services available has been given to the patient.  Reviewed patient's diet, addressing concerns and/or questions.   He is at risk for psychosocial distress and has been provided with information to reduce risk.       Follow-up   No follow-ups on file.     Follow-up Visit   Expected date:  May 19, 2026 (Approximate)      Follow Up Appointment Details:     Follow-up with whom?: PCP    Follow-Up for what?: Adult Preventive    How?: In Person                 Roderick Shultz is a 54 year old, presenting for the following:  Physical        5/19/2025     2:12 PM   Additional Questions   Roomed by Melissa ONTIVEROS   "   HTN: Has been off meds for months     Hx bladder cancer - Follows with urology          5/16/2025   General Health   How would you rate your overall physical health? Good   Feel stress (tense, anxious, or unable to sleep) To some extent   (!) STRESS CONCERN      5/16/2025   Nutrition   Three or more servings of calcium each day? (!) NO   Diet: Regular (no restrictions)   How many servings of fruit and vegetables per day? (!) 0-1   How many sweetened beverages each day? 0-1         5/16/2025   Exercise   Days per week of moderate/strenous exercise 5 days   Average minutes spent exercising at this level 20 min         5/16/2025   Social Factors   Frequency of gathering with friends or relatives Patient declined   Worry food won't last until get money to buy more No   Food not last or not have enough money for food? No   Do you have housing? (Housing is defined as stable permanent housing and does not include staying outside in a car, in a tent, in an abandoned building, in an overnight shelter, or couch-surfing.) Yes   Are you worried about losing your housing? No   Lack of transportation? No   Unable to get utilities (heat,electricity)? No         5/16/2025   Fall Risk   Fallen 2 or more times in the past year? No   Trouble with walking or balance? No          5/16/2025   Dental   Dentist two times every year? Yes       Today's PHQ-2 Score:       5/19/2025     2:04 PM   PHQ-2 ( 1999 [employer])   Q1: Little interest or pleasure in doing things 0   Q2: Feeling down, depressed or hopeless 0   PHQ-2 Score 0    Q1: Little interest or pleasure in doing things Not at all   Q2: Feeling down, depressed or hopeless Not at all   PHQ-2 Score 0       Patient-reported           5/16/2025   Substance Use   Alcohol more than 3/day or more than 7/wk No   Do you use any other substances recreationally? No     Social History     Tobacco Use    Smoking status: Never    Smokeless tobacco: Never   Vaping Use    Vaping status: Never Used  "  Substance Use Topics    Alcohol use: Yes     Comment: occasionally    Drug use: No           5/16/2025   STI Screening   New sexual partner(s) since last STI/HIV test? No   ASCVD Risk   The 10-year ASCVD risk score (Gita GANDHI, et al., 2019) is: 9.4%    Values used to calculate the score:      Age: 54 years      Sex: Male      Is Non- : No      Diabetic: No      Tobacco smoker: No      Systolic Blood Pressure: 151 mmHg      Is BP treated: Yes      HDL Cholesterol: 39 mg/dL      Total Cholesterol: 220 mg/dL    Reviewed and updated as needed this visit by Provider                    Past Medical History:   Diagnosis Date    Bladder cancer (H) 5/9/2015    Environmental allergies     Hypertension     Uncomplicated asthma           Objective    Exam  BP (!) 151/97 (BP Location: Left arm, Patient Position: Sitting, Cuff Size: Adult Large)   Pulse 106   Temp 98.4  F (36.9  C) (Oral)   Resp 18   Ht 1.702 m (5' 7\")   Wt 79.8 kg (176 lb)   SpO2 98%   BMI 27.57 kg/m     Estimated body mass index is 27.57 kg/m  as calculated from the following:    Height as of this encounter: 1.702 m (5' 7\").    Weight as of this encounter: 79.8 kg (176 lb).    Physical Exam  GENERAL: alert and no distress  EYES: Eyes grossly normal to inspection, and conjunctivae and sclerae normal  HENT: ear canals and TM's normal, and mouth without ulcers or lesions  RESP: lungs clear to auscultation - no rales, rhonchi or wheezes  CV: regular rate and rhythm, normal S1 S2, no S3 or S4, no murmur, click or rub, no peripheral edema  MS: no gross musculoskeletal defects noted, no edema  PSYCH: mentation appears normal, affect normal/bright          Signed Electronically by: ЮЛИЯ Espinosa CNP    "

## 2025-05-19 NOTE — PATIENT INSTRUCTIONS
Patient Education   Preventive Care Advice   This is general advice given by our system to help you stay healthy. However, your care team may have specific advice just for you. Please talk to your care team about your preventive care needs.  Nutrition  Eat 5 or more servings of fruits and vegetables each day.  Try wheat bread, brown rice and whole grain pasta (instead of white bread, rice, and pasta).  Get enough calcium and vitamin D. Check the label on foods and aim for 100% of the RDA (recommended daily allowance).  Lifestyle  Exercise at least 150 minutes each week  (30 minutes a day, 5 days a week).  Do muscle strengthening activities 2 days a week. These help control your weight and prevent disease.  No smoking.  Wear sunscreen to prevent skin cancer.  Have a dental exam and cleaning every 6 months.  Yearly exams  See your health care team every year to talk about:  Any changes in your health.  Any medicines your care team has prescribed.  Preventive care, family planning, and ways to prevent chronic diseases.  Shots (vaccines)   HPV shots (up to age 26), if you've never had them before.  Hepatitis B shots (up to age 59), if you've never had them before.  COVID-19 shot: Get this shot when it's due.  Flu shot: Get a flu shot every year.  Tetanus shot: Get a tetanus shot every 10 years.  Pneumococcal, hepatitis A, and RSV shots: Ask your care team if you need these based on your risk.  Shingles shot (for age 50 and up)  General health tests  Diabetes screening:  Starting at age 35, Get screened for diabetes at least every 3 years.  If you are younger than age 35, ask your care team if you should be screened for diabetes.  Cholesterol test: At age 39, start having a cholesterol test every 5 years, or more often if advised.  Bone density scan (DEXA): At age 50, ask your care team if you should have this scan for osteoporosis (brittle bones).  Hepatitis C: Get tested at least once in your life.  STIs (sexually  transmitted infections)  Before age 24: Ask your care team if you should be screened for STIs.  After age 24: Get screened for STIs if you're at risk. You are at risk for STIs (including HIV) if:  You are sexually active with more than one person.  You don't use condoms every time.  You or a partner was diagnosed with a sexually transmitted infection.  If you are at risk for HIV, ask about PrEP medicine to prevent HIV.  Get tested for HIV at least once in your life, whether you are at risk for HIV or not.  Cancer screening tests  Cervical cancer screening: If you have a cervix, begin getting regular cervical cancer screening tests starting at age 21.  Breast cancer scan (mammogram): If you've ever had breasts, begin having regular mammograms starting at age 40. This is a scan to check for breast cancer.  Colon cancer screening: It is important to start screening for colon cancer at age 45.  Have a colonoscopy test every 10 years (or more often if you're at risk) Or, ask your provider about stool tests like a FIT test every year or Cologuard test every 3 years.  To learn more about your testing options, visit:   .  For help making a decision, visit:   https://bit.ly/ko53021.  Prostate cancer screening test: If you have a prostate, ask your care team if a prostate cancer screening test (PSA) at age 55 is right for you.  Lung cancer screening: If you are a current or former smoker ages 50 to 80, ask your care team if ongoing lung cancer screenings are right for you.  For informational purposes only. Not to replace the advice of your health care provider. Copyright   2023 [facility]. All rights reserved. Clinically reviewed by the [facility]. [employer] 313315 - REV 01/24.  Learning About Stress  What is stress?     Stress is your body's response to a hard situation. Your body can have a physical, emotional, or mental response. Stress is a fact of life for most people, and it  affects everyone differently. What causes stress for you may not be stressful for someone else.  A lot of things can cause stress. You may feel stress when you go on a job interview, take a test, or run a race. This kind of short-term stress is normal and even useful. It can help you if you need to work hard or react quickly. For example, stress can help you finish an important job on time.  Long-term stress is caused by ongoing stressful situations or events. Examples of long-term stress include long-term health problems, ongoing problems at work, or conflicts in your family. Long-term stress can harm your health.  How does stress affect your health?  When you are stressed, your body responds as though you are in danger. It makes hormones that speed up your heart, make you breathe faster, and give you a burst of energy. This is called the fight-or-flight stress response. If the stress is over quickly, your body goes back to normal and no harm is done.  But if stress happens too often or lasts too long, it can have bad effects. Long-term stress can make you more likely to get sick, and it can make symptoms of some diseases worse. If you tense up when you are stressed, you may develop neck, shoulder, or low back pain. Stress is linked to high blood pressure and heart disease.  Stress also harms your emotional health. It can make you ascencio, tense, or depressed. Your relationships may suffer, and you may not do well at work or school.  What can you do to manage stress?  You can try these things to help manage stress:   Do something active. Exercise or activity can help reduce stress. Walking is a great way to get started. Even everyday activities such as housecleaning or yard work can help.  Try yoga or brian chi. These techniques combine exercise and meditation. You may need some training at first to learn them.  Do something you enjoy. For example, listen to music or go to a movie. Practice your hobby or do volunteer  "work.  Meditate. This can help you relax, because you are not worrying about what happened before or what may happen in the future.  Do guided imagery. Imagine yourself in any setting that helps you feel calm. You can use online videos, books, or a teacher to guide you.  Do breathing exercises. For example:  From a standing position, bend forward from the waist with your knees slightly bent. Let your arms dangle close to the floor.  Breathe in slowly and deeply as you return to a standing position. Roll up slowly and lift your head last.  Hold your breath for just a few seconds in the standing position.  Breathe out slowly and bend forward from the waist.  Let your feelings out. Talk, laugh, cry, and express anger when you need to. Talking with supportive friends or family, a counselor, or a samantha leader about your feelings is a healthy way to relieve stress. Avoid discussing your feelings with people who make you feel worse.  Write. It may help to write about things that are bothering you. This helps you find out how much stress you feel and what is causing it. When you know this, you can find better ways to cope.  What can you do to prevent stress?  You might try some of these things to help prevent stress:  Manage your time. This helps you find time to do the things you want and need to do.  Get enough sleep. Your body recovers from the stresses of the day while you are sleeping.  Get support. Your family, friends, and community can make a difference in how you experience stress.  Limit your news feed. Avoid or limit time on social media or news that may make you feel stressed.  Do something active. Exercise or activity can help reduce stress. Walking is a great way to get started.  Where can you learn more?  Go to https://www.[employer].net/patiented  Enter N032 in the search box to learn more about \"Learning About Stress.\"  Current as of: October 24, 2024  Content Version: 14.4 2024-2025 [employer].   Care instructions adapted under license by your healthcare professional. If you have questions about a medical condition or this instruction, always ask your healthcare professional. [employer] disclaims any warranty or liability for your use of this information.

## 2025-05-20 ENCOUNTER — RESULTS FOLLOW-UP (OUTPATIENT)
Dept: FAMILY MEDICINE | Facility: CLINIC | Age: 55
End: 2025-05-20

## 2025-06-23 ENCOUNTER — OFFICE VISIT (OUTPATIENT)
Dept: FAMILY MEDICINE | Facility: CLINIC | Age: 55
End: 2025-06-23
Payer: COMMERCIAL

## 2025-06-23 VITALS
HEART RATE: 78 BPM | RESPIRATION RATE: 16 BRPM | HEIGHT: 67 IN | DIASTOLIC BLOOD PRESSURE: 77 MMHG | SYSTOLIC BLOOD PRESSURE: 115 MMHG | WEIGHT: 178 LBS | TEMPERATURE: 97.7 F | BODY MASS INDEX: 27.94 KG/M2 | OXYGEN SATURATION: 100 %

## 2025-06-23 DIAGNOSIS — I10 BENIGN HYPERTENSION: Primary | ICD-10-CM

## 2025-06-23 PROCEDURE — 3078F DIAST BP <80 MM HG: CPT

## 2025-06-23 PROCEDURE — 99213 OFFICE O/P EST LOW 20 MIN: CPT

## 2025-06-23 PROCEDURE — 36415 COLL VENOUS BLD VENIPUNCTURE: CPT

## 2025-06-23 PROCEDURE — 3074F SYST BP LT 130 MM HG: CPT

## 2025-06-23 PROCEDURE — 80048 BASIC METABOLIC PNL TOTAL CA: CPT

## 2025-06-23 RX ORDER — LISINOPRIL 10 MG/1
10 TABLET ORAL DAILY
Qty: 90 TABLET | Refills: 2 | Status: SHIPPED | OUTPATIENT
Start: 2025-06-23

## 2025-06-23 NOTE — PROGRESS NOTES
"  Assessment & Plan     Benign hypertension  Stable, well controlled since restarting lisinopril. Will update labs today. Follow up in 1 year or sooner if needed.  - Basic metabolic panel  (Ca, Cl, CO2, Creat, Gluc, K, Na, BUN); Future  - lisinopril (ZESTRIL) 10 MG tablet; Take 1 tablet (10 mg) by mouth daily.  - Basic metabolic panel  (Ca, Cl, CO2, Creat, Gluc, K, Na, BUN)            Follow-up   No follow-ups on file.        Roderick Shultz is a 54 year old, presenting for the following health issues:  Hypertension        6/23/2025     1:58 PM   Additional Questions   Roomed by Mya SALDANA     History of Present Illness       Reason for visit:  Follow up He is missing 1 dose(s) of medications per week.  He is not taking prescribed medications regularly due to remembering to take.        Hypertension Follow-up    Do you check your blood pressure regularly outside of the clinic? No   Are you following a low salt diet? No  Are your blood pressures ever more than 140 on the top number (systolic) OR more   than 90 on the bottom number (diastolic), for example 140/90? N/A    BP Readings from Last 2 Encounters:   06/23/25 115/77   05/19/25 (!) 142/84     Doesn't monitor BP at home.   Denies headaches, blurry vision/vision changes, chest pain, palpitations, shortness of breath, MILLIGAN, or lightheadedness/dizziness.   No coughing        Objective    /77 (BP Location: Left arm, Patient Position: Left side, Cuff Size: Adult Large)   Pulse 78   Temp 97.7  F (36.5  C) (Temporal)   Resp 16   Ht 1.702 m (5' 7\")   Wt 80.7 kg (178 lb)   SpO2 100%   BMI 27.88 kg/m    Body mass index is 27.88 kg/m .  Physical Exam   GENERAL: alert and no distress  RESP: lungs clear to auscultation - no rales, rhonchi or wheezes  CV: regular rate and rhythm, normal S1 S2, no S3 or S4, no murmur, click or rub, no peripheral edema   PSYCH: mentation appears normal, affect normal/bright            Signed Electronically by: ЮЛИЯ Espinosa " CNP

## 2025-06-24 ENCOUNTER — RESULTS FOLLOW-UP (OUTPATIENT)
Dept: FAMILY MEDICINE | Facility: CLINIC | Age: 55
End: 2025-06-24

## 2025-06-24 LAB
ANION GAP SERPL CALCULATED.3IONS-SCNC: 10 MMOL/L (ref 7–15)
BUN SERPL-MCNC: 20.4 MG/DL (ref 6–20)
CALCIUM SERPL-MCNC: 9 MG/DL (ref 8.8–10.4)
CHLORIDE SERPL-SCNC: 102 MMOL/L (ref 98–107)
CREAT SERPL-MCNC: 0.97 MG/DL (ref 0.67–1.17)
EGFRCR SERPLBLD CKD-EPI 2021: >90 ML/MIN/1.73M2
GLUCOSE SERPL-MCNC: 94 MG/DL (ref 70–99)
HCO3 SERPL-SCNC: 24 MMOL/L (ref 22–29)
POTASSIUM SERPL-SCNC: 4.5 MMOL/L (ref 3.4–5.3)
SODIUM SERPL-SCNC: 136 MMOL/L (ref 135–145)